# Patient Record
Sex: FEMALE | Race: WHITE | Employment: UNEMPLOYED | RURAL
[De-identification: names, ages, dates, MRNs, and addresses within clinical notes are randomized per-mention and may not be internally consistent; named-entity substitution may affect disease eponyms.]

---

## 2017-07-03 ENCOUNTER — OFFICE VISIT (OUTPATIENT)
Dept: INTERNAL MEDICINE CLINIC | Age: 32
End: 2017-07-03

## 2017-07-03 VITALS
DIASTOLIC BLOOD PRESSURE: 64 MMHG | RESPIRATION RATE: 17 BRPM | HEART RATE: 95 BPM | OXYGEN SATURATION: 97 % | SYSTOLIC BLOOD PRESSURE: 102 MMHG | HEIGHT: 56 IN | TEMPERATURE: 96.8 F

## 2017-07-03 DIAGNOSIS — D50.9 IRON DEFICIENCY ANEMIA, UNSPECIFIED IRON DEFICIENCY ANEMIA TYPE: ICD-10-CM

## 2017-07-03 DIAGNOSIS — N30.00 ACUTE CYSTITIS WITHOUT HEMATURIA: Primary | ICD-10-CM

## 2017-07-03 DIAGNOSIS — Z13.220 SCREENING FOR CHOLESTEROL LEVEL: ICD-10-CM

## 2017-07-03 DIAGNOSIS — M54.32 BILATERAL SCIATICA: ICD-10-CM

## 2017-07-03 DIAGNOSIS — Q68.8 ARTHROGRYPOSIS: ICD-10-CM

## 2017-07-03 DIAGNOSIS — K44.9 ESOPHAGEAL HIATAL HERNIA: ICD-10-CM

## 2017-07-03 DIAGNOSIS — M54.31 BILATERAL SCIATICA: ICD-10-CM

## 2017-07-03 DIAGNOSIS — Z76.89 ENCOUNTER TO ESTABLISH CARE: ICD-10-CM

## 2017-07-03 DIAGNOSIS — F41.9 ANXIETY: ICD-10-CM

## 2017-07-03 LAB
BILIRUB UR QL STRIP: NEGATIVE
GLUCOSE UR-MCNC: NEGATIVE MG/DL
KETONES P FAST UR STRIP-MCNC: NEGATIVE MG/DL
PH UR STRIP: 5.5 [PH] (ref 4.6–8)
PROT UR QL STRIP: NEGATIVE MG/DL
SP GR UR STRIP: 1.01 (ref 1–1.03)
UA UROBILINOGEN AMB POC: NORMAL (ref 0.2–1)
URINALYSIS CLARITY POC: CLEAR
URINALYSIS COLOR POC: NORMAL
URINE BLOOD POC: NORMAL
URINE LEUKOCYTES POC: NORMAL
URINE NITRITES POC: NEGATIVE

## 2017-07-03 RX ORDER — HYDROCODONE BITARTRATE AND ACETAMINOPHEN 10; 325 MG/1; MG/1
TABLET ORAL
COMMUNITY
End: 2017-09-06 | Stop reason: ALTCHOICE

## 2017-07-03 RX ORDER — TIZANIDINE 4 MG/1
TABLET ORAL
COMMUNITY
End: 2017-08-31 | Stop reason: SDUPTHER

## 2017-07-03 RX ORDER — TOLTERODINE 4 MG/1
4 CAPSULE, EXTENDED RELEASE ORAL DAILY
COMMUNITY
End: 2017-07-03 | Stop reason: SDUPTHER

## 2017-07-03 RX ORDER — METOPROLOL TARTRATE 50 MG/1
TABLET ORAL
COMMUNITY
End: 2017-07-03 | Stop reason: SDUPTHER

## 2017-07-03 RX ORDER — ZOLPIDEM TARTRATE 10 MG/1
TABLET ORAL
COMMUNITY
End: 2017-07-03 | Stop reason: SDUPTHER

## 2017-07-03 RX ORDER — BUSPIRONE HYDROCHLORIDE 15 MG/1
TABLET ORAL
COMMUNITY
End: 2017-10-18 | Stop reason: ALTCHOICE

## 2017-07-03 RX ORDER — ALBUTEROL SULFATE 90 UG/1
AEROSOL, METERED RESPIRATORY (INHALATION)
COMMUNITY
End: 2018-02-01 | Stop reason: ALTCHOICE

## 2017-07-03 RX ORDER — SERTRALINE HYDROCHLORIDE 100 MG/1
TABLET, FILM COATED ORAL
COMMUNITY
End: 2017-08-31 | Stop reason: SDUPTHER

## 2017-07-03 RX ORDER — DIAZEPAM 2 MG/1
TABLET ORAL
COMMUNITY
Start: 2017-07-03 | End: 2017-07-03 | Stop reason: SDUPTHER

## 2017-07-03 RX ORDER — ESOMEPRAZOLE MAGNESIUM 40 MG/1
CAPSULE, DELAYED RELEASE ORAL DAILY
COMMUNITY
End: 2017-08-01 | Stop reason: ALTCHOICE

## 2017-07-03 RX ORDER — NITROFURANTOIN 25; 75 MG/1; MG/1
100 CAPSULE ORAL 2 TIMES DAILY
Qty: 14 CAP | Refills: 0 | Status: SHIPPED | OUTPATIENT
Start: 2017-07-03 | End: 2017-07-10

## 2017-07-03 RX ORDER — DICLOFENAC SODIUM 10 MG/G
GEL TOPICAL 4 TIMES DAILY
COMMUNITY

## 2017-07-03 RX ORDER — TOLTERODINE 4 MG/1
4 CAPSULE, EXTENDED RELEASE ORAL DAILY
Qty: 90 CAP | Refills: 1 | Status: SHIPPED | OUTPATIENT
Start: 2017-07-03 | End: 2017-10-18 | Stop reason: ALTCHOICE

## 2017-07-03 RX ORDER — DIAZEPAM 2 MG/1
2 TABLET ORAL
Qty: 30 TAB | Refills: 3 | Status: SHIPPED | OUTPATIENT
Start: 2017-07-03 | End: 2017-09-23 | Stop reason: SDUPTHER

## 2017-07-03 RX ORDER — DIAZEPAM 2 MG/1
TABLET ORAL
COMMUNITY
End: 2017-07-03 | Stop reason: SDUPTHER

## 2017-07-03 RX ORDER — NITROGLYCERIN 0.6 MG/1
TABLET SUBLINGUAL
COMMUNITY
End: 2017-10-18 | Stop reason: ALTCHOICE

## 2017-07-03 RX ORDER — METOPROLOL TARTRATE 50 MG/1
TABLET ORAL
Qty: 180 TAB | Refills: 1 | Status: SHIPPED | OUTPATIENT
Start: 2017-07-03 | End: 2017-12-11 | Stop reason: SDUPTHER

## 2017-07-03 RX ORDER — ZOLPIDEM TARTRATE 10 MG/1
10 TABLET ORAL
Qty: 30 TAB | Refills: 5 | Status: SHIPPED | OUTPATIENT
Start: 2017-07-03 | End: 2018-01-08 | Stop reason: SDUPTHER

## 2017-07-03 NOTE — MR AVS SNAPSHOT
Visit Information Date & Time Provider Department Dept. Phone Encounter #  
 7/3/2017  3:00 PM Susie Gotti NP [facility] [phone] [id number] Follow-up Instructions Return in about 1 month (around 8/3/2017) for physical exam. Upcoming Health Maintenance Date Due DTaP/Tdap/Td series (1 - Tdap) 4/3/2008 PAP AKA CERVICAL CYTOLOGY 4/3/2008 INFLUENZA AGE 9 TO ADULT 8/1/2017 Allergies as of 7/3/2017  Review Complete On: 7/3/2017 By: Susie Gotti NP No Known Allergies Current Immunizations  Never Reviewed No immunizations on file. Not reviewed this visit You Were Diagnosed With   
  
 Codes Comments Encounter to establish care    -  Primary ICD-10-CM: Z76.89 
ICD-9-CM: V65.8 Bilateral sciatica     ICD-10-CM: M54.31, M54.32 
ICD-9-CM: 724.3 related Acute cystitis without hematuria     ICD-10-CM: N30.00 ICD-9-CM: 595.0 Esophageal hiatal hernia     ICD-10-CM: K44.9 ICD-9-CM: 553.3 Anxiety     ICD-10-CM: F41.9 ICD-9-CM: 300.00 Iron deficiency anemia, unspecified iron deficiency anemia type     ICD-10-CM: D50.9 ICD-9-CM: 280.9 Screening for cholesterol level     ICD-10-CM: Z13.220 ICD-9-CM: V77.91 Vitals BP Pulse Temp Resp Height(growth percentile) LMP  
 102/64 (BP 1 Location: Left arm, BP Patient Position: Sitting) 95 96.8 °F (36 °C) (Oral) 17 4' 8\" (1.422 m) 07/02/2017 (Exact Date) SpO2 OB Status Smoking Status 97% Having regular periods Never Smoker Preferred Pharmacy Pharmacy Name Phone [facility] [address] [facility], [facility] [address] [facility] [phone] Your Updated Medication List  
  
   
This list is accurate as of: 7/3/17  4:17 PM.  Always use your most recent med list.  
  
  
  
  
 albuterol 90 mcg/actuation inhaler Commonly known as:  PROVENTIL HFA, VENTOLIN HFA, PROAIR HFA Take  by inhalation. busPIRone 15 mg tablet Commonly known as:  BUSPAR Take 15 mg by mouth daily. diazePAM 2 mg tablet Commonly known as:  VALIUM Take 1 Tab by mouth every eight (8) hours as needed for Anxiety. Max Daily Amount: 6 mg. Take  by mouth every eight (8) hours as needed for muscle spasms. diclofenac 1 % Gel Commonly known as:  VOLTAREN Apply  to affected area four (4) times daily. HYDROcodone-acetaminophen  mg tablet Commonly known as:  Supriya Laity Take by mouth every 6 hours as needed. metoprolol tartrate 50 mg tablet Commonly known as:  LOPRESSOR Take 50 mg by mouth 2 times daily. NexIUM 40 mg capsule Generic drug:  esomeprazole Take  by mouth daily. nitrofurantoin (macrocrystal-monohydrate) 100 mg capsule Commonly known as:  MACROBID Take 1 Cap by mouth two (2) times a day for 7 days. nitroglycerin 0.6 mg SL tablet Commonly known as:  [place] [address] 0.6 mg under the tongue every 5 minutes as needed for chest pain. (if pain persists after 5 min, call 911) May take up to 3 doses  
  
 sertraline 100 mg tablet Commonly known as:  ZOLOFT Take 100 mg by mouth 2 times daily. tiZANidine 4 mg tablet Commonly known as:  Patbriana Beets Take 4 mg by mouth 2 times daily. tolterodine ER 4 mg ER capsule Commonly known as:  Lopez Bodily Take 1 Cap by mouth daily. zolpidem 10 mg tablet Commonly known as:  AMBIEN Take 1 Tab by mouth nightly as needed for Sleep. Max Daily Amount: 10 mg.  
  
  
  
  
Prescriptions Printed Refills  
 zolpidem (AMBIEN) 10 mg tablet 5 Sig: Take 1 Tab by mouth nightly as needed for Sleep. Max Daily Amount: 10 mg.  
 Class: Print Route: Oral  
 diazePAM (VALIUM) 2 mg tablet 3 Sig: Take 1 Tab by mouth every eight (8) hours as needed for Anxiety. Max Daily Amount: 6 mg. Take  by mouth every eight (8) hours as needed for muscle spasms. Class: Print Route: Oral  
  
Prescriptions Sent to Pharmacy Refills nitrofurantoin, macrocrystal-monohydrate, (MACROBID) 100 mg capsule 0 Sig: Take 1 Cap by mouth two (2) times a day for 7 days. Class: Normal  
 Pharmacy: [address], [address] [facility] Ph #: [phone] Route: Oral  
 tolterodine ER (DETROL LA) 4 mg ER capsule 1 Sig: Take 1 Cap by mouth daily. Class: Normal  
 Pharmacy: [address], [address] [facility] Ph #: [phone] Route: Oral  
 metoprolol tartrate (LOPRESSOR) 50 mg tablet 1 Sig: Take 50 mg by mouth 2 times daily. Class: Normal  
 Pharmacy: [address], [address] [facility] Ph #: [phone] We Performed the Following AMB POC URINALYSIS DIP STICK MANUAL W/O MICRO [43194 CPT(R)] REFERRAL TO GASTROENTEROLOGY [DHQ34 Custom] Comments:  
 Please evaluate and treatment for Hiatal Hernia ? Pain after eating REFERRAL TO ORTHOPEDICS [MPW887 Custom] Comments:  
 Please evaluate and treat patient for sciatica Follow-up Instructions Return in about 1 month (around 8/3/2017) for physical exam. To-Do List   
 07/03/2017 Lab:  CBC WITH AUTOMATED DIFF   
  
 07/03/2017 Lab:  LIPID PANEL   
  
 07/03/2017 Lab:  METABOLIC PANEL, COMPREHENSIVE   
  
 07/03/2017 Lab:  TSH 3RD GENERATION Referral Information Referral ID Referred By Referred To  
  
 [id number] Lucy Laboy1 N Ignacio Ryan MD   
   [address]  Phone: [phone] Fax: [phone] Visits Status Start Date End Date 1 New Request 7/3/17 7/3/18 If your referral has a status of pending review or denied, additional information will be sent to support the outcome of this decision. Referral ID Referred By Referred To  
 [id number] Christopherken Archibaldleonard CALDERON Not Available Visits Status Start Date End Date 1 New Request 7/3/17 7/3/18 If your referral has a status of pending review or denied, additional information will be sent to support the outcome of this decision. Patient Instructions Call office with your date and time for appointment Introducing \[facility]\"" & [facility] SERVICES! Ping Mendez introduces Roundscapes patient portal. Now you can access parts of your medical record, email your doctor's office, and request medication refills online. 1. In your internet browser, go to [email or web address] 2. Click on the First Time User? Click Here link in the Sign In box. You will see the New Member Sign Up page. 3. Enter your Roundscapes Access Code exactly as it appears below. You will not need to use this code after youve completed the sign-up process. If you do not sign up before the expiration date, you must request a new code. · Roundscapes Access Code: [email or web address] Expires: 10/1/2017  2:54 PM 
 
4. Enter the last four digits of your Social Security Number (xxxx) and Date of Birth (mm/dd/yyyy) as indicated and click Submit. You will be taken to the next sign-up page. 5. Create a Roundscapes ID. This will be your Roundscapes login ID and cannot be changed, so think of one that is secure and easy to remember. 6. Create a Roundscapes password. You can change your password at any time. 7. Enter your Password Reset Question and Answer. This can be used at a later time if you forget your password. 8. Enter your e-mail address. You will receive e-mail notification when new information is available in [address] 9. Click Sign Up. You can now view and download portions of your medical record. 10. Click the Download Summary menu link to download a portable copy of your medical information. If you have questions, please visit the Frequently Asked Questions section of the Roundscapes website. Remember, Roundscapes is NOT to be used for urgent needs. For medical emergencies, dial 911. Now available from your iPhone and Android! Please provide this summary of care documentation to your next provider. Your primary care clinician is listed as Sylvester Jean. If you have any questions after today's visit, please call [phone].

## 2017-07-07 NOTE — PROGRESS NOTES
HISTORY OF PRESENT ILLNESS  Daisy Lizama is a 29 y.o. female. HPI  Chief Complaint   Patient presents with    New Patient     ESTABLISH CARE     Past Medical History:   Diagnosis Date    Anxiety     2005    Arthrogryposis     Age 4 years   [employer] Bilateral club feet     Hernia, paraesophageal     Hiatal hernia     Neurogenic bladder     Neurogenic bowel     S/P hip replacement both hips     [facility]/ July 10 appointment.  Sacral agenesis     Sciatica     After hip surgery    Umbilical hernia      Social History     Social History    Marital status: UNKNOWN     Spouse name: N/A    Number of children: N/A    Years of education: N/A     Occupational History    Not on file. Social History Main Topics    Smoking status: Never Smoker    Smokeless tobacco: Never Used    Alcohol use No    Drug use: No    Sexual activity: Not Currently     Other Topics Concern    Not on file     Social History Narrative    No narrative on file     Review of Systems   Constitutional: Negative for chills, fever and malaise/fatigue. HENT: Negative. Eyes: Negative. Cardiovascular: Negative. Gastrointestinal: Negative. Genitourinary: Negative. Skin: Negative. Physical Exam   Constitutional: She is oriented to person, place, and time. She appears well-developed and well-nourished. HENT:   Head: Normocephalic and atraumatic. Cardiovascular: Normal rate, regular rhythm, normal heart sounds and intact distal pulses. Exam reveals no gallop and no friction rub. No murmur heard. Pulmonary/Chest: Breath sounds normal. No respiratory distress. She has no wheezes. She has no rales. Musculoskeletal: Normal range of motion. Neurological: She is alert and oriented to person, place, and time. Nursing note and vitals reviewed. Plan of care and AVS reviewed with patient who verbalized understanding. ASSESSMENT and PLANNurs    ICD-10-CM ICD-9-CM    1.  Acute cystitis without hematuria N30.00 595.0 nitrofurantoin, macrocrystal-monohydrate, (MACROBID) 100 mg capsule      METABOLIC PANEL, COMPREHENSIVE   2. Encounter to establish care Z76.89 V65.8 AMB POC URINALYSIS DIP STICK MANUAL W/O MICRO   3. Bilateral sciatica M54.31 724.3 REFERRAL TO ORTHOPEDICS    M54.32  REFERRAL TO PAIN MANAGEMENT    related   4. Esophageal hiatal hernia K44.9 553.3 REFERRAL TO GASTROENTEROLOGY      METABOLIC PANEL, COMPREHENSIVE   5. Anxiety F41.9 300.00 TSH 3RD GENERATION   6. Iron deficiency anemia, unspecified iron deficiency anemia type D50.9 280.9 CBC WITH AUTOMATED DIFF   7. Screening for cholesterol level Z13.220 V77.91 LIPID PANEL   8. Arthrogryposis Q68.8 728.3 REFERRAL TO PAIN MANAGEMENT   Nursing to assist  patient with appointment for pain management. To get labs before physical in 1 month.

## 2017-07-17 DIAGNOSIS — D50.9 IRON DEFICIENCY ANEMIA, UNSPECIFIED IRON DEFICIENCY ANEMIA TYPE: Primary | ICD-10-CM

## 2017-07-18 ENCOUNTER — TELEPHONE (OUTPATIENT)
Dept: INTERNAL MEDICINE CLINIC | Age: 32
End: 2017-07-18

## 2017-07-18 NOTE — TELEPHONE ENCOUNTER
Chief Complaint   Patient presents with    Labs     CBC     Left message to inform the patient per Everardo Perez DNP that the daughter is anemic and should take an OTC iron supplement 325 mg orally daily and to repeat CBC in one month. Left message in regards whether she would like the requisition to be mailed or picked up at the office.

## 2017-07-27 ENCOUNTER — CLINICAL SUPPORT (OUTPATIENT)
Dept: INTERNAL MEDICINE CLINIC | Age: 32
End: 2017-07-27

## 2017-07-27 DIAGNOSIS — F41.9 ANXIETY: ICD-10-CM

## 2017-07-27 DIAGNOSIS — Z13.220 SCREENING FOR CHOLESTEROL LEVEL: ICD-10-CM

## 2017-07-27 DIAGNOSIS — D50.9 IRON DEFICIENCY ANEMIA, UNSPECIFIED IRON DEFICIENCY ANEMIA TYPE: ICD-10-CM

## 2017-07-27 DIAGNOSIS — N30.00 ACUTE CYSTITIS WITHOUT HEMATURIA: ICD-10-CM

## 2017-07-27 DIAGNOSIS — K44.9 ESOPHAGEAL HIATAL HERNIA: ICD-10-CM

## 2017-07-27 NOTE — PROGRESS NOTES
Chief Complaint   Patient presents with    Labs Only     TSH, CMP, CBC, LIPID      The patient presents with lab draw only. Labs drawn from right antecubital fossa. No negative reaction noted to site of lab draw.

## 2017-07-28 DIAGNOSIS — D50.9 IRON DEFICIENCY ANEMIA, UNSPECIFIED IRON DEFICIENCY ANEMIA TYPE: Primary | ICD-10-CM

## 2017-07-28 LAB
ALBUMIN SERPL-MCNC: 4.3 G/DL (ref 3.5–5.5)
ALBUMIN/GLOB SERPL: 2 {RATIO} (ref 1.2–2.2)
ALP SERPL-CCNC: 62 IU/L (ref 39–117)
ALT SERPL-CCNC: 15 IU/L (ref 0–32)
AST SERPL-CCNC: 21 IU/L (ref 0–40)
BASOPHILS # BLD AUTO: 0 X10E3/UL (ref 0–0.2)
BASOPHILS NFR BLD AUTO: 1 %
BILIRUB SERPL-MCNC: 0.7 MG/DL (ref 0–1.2)
BUN SERPL-MCNC: 14 MG/DL (ref 6–20)
BUN/CREAT SERPL: 20 (ref 9–23)
CALCIUM SERPL-MCNC: 8.5 MG/DL (ref 8.7–10.2)
CHLORIDE SERPL-SCNC: 101 MMOL/L (ref 96–106)
CHOLEST SERPL-MCNC: 110 MG/DL (ref 100–199)
CO2 SERPL-SCNC: 20 MMOL/L (ref 18–29)
CREAT SERPL-MCNC: 0.7 MG/DL (ref 0.57–1)
EOSINOPHIL # BLD AUTO: 0.2 X10E3/UL (ref 0–0.4)
EOSINOPHIL NFR BLD AUTO: 3 %
ERYTHROCYTE [DISTWIDTH] IN BLOOD BY AUTOMATED COUNT: 18.5 % (ref 12.3–15.4)
GLOBULIN SER CALC-MCNC: 2.2 G/DL (ref 1.5–4.5)
GLUCOSE SERPL-MCNC: 88 MG/DL (ref 65–99)
HCT VFR BLD AUTO: 31.3 % (ref 34–46.6)
HDLC SERPL-MCNC: 36 MG/DL
HGB BLD-MCNC: 8.5 G/DL (ref 11.1–15.9)
IMM GRANULOCYTES # BLD: 0 X10E3/UL (ref 0–0.1)
IMM GRANULOCYTES NFR BLD: 1 %
INTERPRETATION, 910389: NORMAL
LDLC SERPL CALC-MCNC: 53 MG/DL (ref 0–99)
LYMPHOCYTES # BLD AUTO: 1.1 X10E3/UL (ref 0.7–3.1)
LYMPHOCYTES NFR BLD AUTO: 19 %
MCH RBC QN AUTO: 18.6 PG (ref 26.6–33)
MCHC RBC AUTO-ENTMCNC: 27.2 G/DL (ref 31.5–35.7)
MCV RBC AUTO: 68 FL (ref 79–97)
MONOCYTES # BLD AUTO: 0.4 X10E3/UL (ref 0.1–0.9)
MONOCYTES NFR BLD AUTO: 6 %
NEUTROPHILS # BLD AUTO: 4.3 X10E3/UL (ref 1.4–7)
NEUTROPHILS NFR BLD AUTO: 70 %
PLATELET # BLD AUTO: 150 X10E3/UL (ref 150–379)
POTASSIUM SERPL-SCNC: 4.4 MMOL/L (ref 3.5–5.2)
PROT SERPL-MCNC: 6.5 G/DL (ref 6–8.5)
RBC # BLD AUTO: 4.58 X10E6/UL (ref 3.77–5.28)
SODIUM SERPL-SCNC: 138 MMOL/L (ref 134–144)
TRIGL SERPL-MCNC: 105 MG/DL (ref 0–149)
TSH SERPL DL<=0.005 MIU/L-ACNC: 4.03 UIU/ML (ref 0.45–4.5)
VLDLC SERPL CALC-MCNC: 21 MG/DL (ref 5–40)
WBC # BLD AUTO: 6.1 X10E3/UL (ref 3.4–10.8)

## 2017-07-28 RX ORDER — FERROUS SULFATE 325(65) MG
325 TABLET, DELAYED RELEASE (ENTERIC COATED) ORAL 2 TIMES DAILY
COMMUNITY
Start: 2017-07-28 | End: 2018-02-07 | Stop reason: SDUPTHER

## 2017-08-01 ENCOUNTER — OFFICE VISIT (OUTPATIENT)
Dept: INTERNAL MEDICINE CLINIC | Age: 32
End: 2017-08-01

## 2017-08-01 VITALS
OXYGEN SATURATION: 98 % | DIASTOLIC BLOOD PRESSURE: 59 MMHG | RESPIRATION RATE: 17 BRPM | HEIGHT: 56 IN | HEART RATE: 80 BPM | SYSTOLIC BLOOD PRESSURE: 101 MMHG | TEMPERATURE: 96.8 F

## 2017-08-01 DIAGNOSIS — L85.3 DRY SKIN: ICD-10-CM

## 2017-08-01 DIAGNOSIS — K21.9 GASTROESOPHAGEAL REFLUX DISEASE WITHOUT ESOPHAGITIS: ICD-10-CM

## 2017-08-01 DIAGNOSIS — D50.9 IRON DEFICIENCY ANEMIA, UNSPECIFIED IRON DEFICIENCY ANEMIA TYPE: Primary | ICD-10-CM

## 2017-08-01 DIAGNOSIS — Z00.00 PHYSICAL EXAM: ICD-10-CM

## 2017-08-01 PROBLEM — M54.50 LOW BACK PAIN: Status: ACTIVE | Noted: 2017-07-31

## 2017-08-01 RX ORDER — PETROLATUM 42 G/100G
OINTMENT TOPICAL AS NEEDED
Refills: 0 | COMMUNITY
Start: 2017-08-01

## 2017-08-01 RX ORDER — METHYLPREDNISOLONE 4 MG/1
TABLET ORAL
COMMUNITY
Start: 2017-07-31 | End: 2017-08-01 | Stop reason: ALTCHOICE

## 2017-08-01 RX ORDER — PHENOL/SODIUM PHENOLATE
20 AEROSOL, SPRAY (ML) MUCOUS MEMBRANE DAILY
Qty: 90 TAB | Refills: 1 | Status: SHIPPED | OUTPATIENT
Start: 2017-08-01 | End: 2017-10-18 | Stop reason: ALTCHOICE

## 2017-08-01 RX ORDER — HYDROCODONE BITARTRATE AND ACETAMINOPHEN 5; 325 MG/1; MG/1
1 TABLET ORAL
COMMUNITY
Start: 2017-07-31 | End: 2017-08-06 | Stop reason: SDUPTHER

## 2017-08-01 RX ORDER — ESOMEPRAZOLE MAGNESIUM 40 MG/1
CAPSULE, DELAYED RELEASE ORAL DAILY
Status: CANCELLED | OUTPATIENT
Start: 2017-08-01

## 2017-08-01 RX ORDER — HYDROCODONE BITARTRATE AND ACETAMINOPHEN 10; 325 MG/1; MG/1
TABLET ORAL
COMMUNITY
End: 2017-09-06 | Stop reason: ALTCHOICE

## 2017-08-01 NOTE — MR AVS SNAPSHOT
Visit Information Date & Time Provider Department Dept. Phone Encounter #  
 8/1/2017  1:00 PM Juan Diego Bronson, 1 [facility] [id number] Follow-up Instructions Return in about 6 months (around 2/1/2018) for Medication. Upcoming Health Maintenance Date Due  
 PAP AKA CERVICAL CYTOLOGY 9/30/2017* DTaP/Tdap/Td series (2 - Td) 7/3/2027 *Topic was postponed. The date shown is not the original due date. Allergies as of 8/1/2017  Review Complete On: 8/1/2017 By: Juan Diego Bronson NP No Known Allergies Current Immunizations  Never Reviewed No immunizations on file. Not reviewed this visit You Were Diagnosed With   
  
 Codes Comments Physical exam    -  Primary ICD-10-CM: Z00.00 ICD-9-CM: V70.9 Gastroesophageal reflux disease without esophagitis     ICD-10-CM: K21.9 ICD-9-CM: 530.81 Dry skin     ICD-10-CM: L85.3 ICD-9-CM: 701.1 Iron deficiency anemia, unspecified iron deficiency anemia type     ICD-10-CM: D50.9 ICD-9-CM: 280.9 Vitals BP Pulse Temp Resp Height(growth percentile) LMP  
 101/59 (BP 1 Location: Left arm, BP Patient Position: Sitting) 80 96.8 °F (36 °C) (Oral) 17 4' 8\" (1.422 m) 07/02/2017 (Exact Date) SpO2 OB Status Smoking Status 98% Having regular periods Never Smoker Preferred Pharmacy Pharmacy Name Phone [facility] [address] [facility], [facility] [address] [facility] [phone] Your Updated Medication List  
  
   
This list is accurate as of: 8/1/17  1:41 PM.  Always use your most recent med list.  
  
  
  
  
 albuterol 90 mcg/actuation inhaler Commonly known as:  PROVENTIL HFA, VENTOLIN HFA, PROAIR HFA Take  by inhalation. busPIRone 15 mg tablet Commonly known as:  BUSPAR Take 15 mg by mouth daily. diazePAM 2 mg tablet Commonly known as:  VALIUM Take 1 Tab by mouth every eight (8) hours as needed for Anxiety.  Max Daily Amount: 6 mg. Take  by mouth every eight (8) hours as needed for muscle spasms. diclofenac 1 % Gel Commonly known as:  VOLTAREN Apply  to affected area four (4) times daily. ferrous sulfate 325 mg (65 mg iron) EC tablet Commonly known as:  IRON Take 1 Tab by mouth two (2) times a day. * HYDROcodone-acetaminophen  mg tablet Commonly known as:  Breanna Mcclain Take by mouth every 6 hours as needed. * HYDROcodone-acetaminophen  mg tablet Commonly known as:  Breanna Mcclain Take by mouth every 6 hours as needed. * HYDROcodone-acetaminophen 5-325 mg per tablet Commonly known as:  Breanna Mcclain Take 1 Tab by mouth.  
  
 metoprolol tartrate 50 mg tablet Commonly known as:  LOPRESSOR Take 50 mg by mouth 2 times daily. mineral oil-hydrophil petrolat ointment Commonly known as:  AQUAPHOR Apply  to affected area as needed for Dry Skin. nitroglycerin 0.6 mg SL tablet Commonly known as:  [address] 0.6 mg under the tongue every 5 minutes as needed for chest pain. (if pain persists after 5 min, call 911) May take up to 3 doses Omeprazole delayed release 20 mg tablet Commonly known as:  PRILOSEC D/R Take 1 Tab by mouth daily. sertraline 100 mg tablet Commonly known as:  ZOLOFT Take 100 mg by mouth 2 times daily. tiZANidine 4 mg tablet Commonly known as:  Mariola Padilla Take 4 mg by mouth 2 times daily. tolterodine ER 4 mg ER capsule Commonly known as:  Geovanna Lozoya Take 1 Cap by mouth daily. zolpidem 10 mg tablet Commonly known as:  AMBIEN Take 1 Tab by mouth nightly as needed for Sleep. Max Daily Amount: 10 mg.  
  
 * Notice: This list has 3 medication(s) that are the same as other medications prescribed for you. Read the directions carefully, and ask your doctor or other care provider to review them with you. Prescriptions Sent to Pharmacy Refills Omeprazole delayed release (PRILOSEC D/R) 20 mg tablet 1 Sig: Take 1 Tab by mouth daily. Class: Normal  
 Pharmacy: [address], [address] Ph #: [phone] Route: Oral  
  
Follow-up Instructions Return in about 6 months (around 2/1/2018) for Medication. Patient Instructions Iron-Rich Diet: Care Instructions Your Care Instructions Your body needs iron to make hemoglobin. Hemoglobin is a substance in red blood cells that carries oxygen from the lungs to cells all through your body. If you do not get enough iron, your body makes fewer and smaller red blood cells. As a result, your body's cells may not get enough oxygen. Adult men need 8 milligrams of iron a day; adult women need 18 milligrams of iron a day. After menopause, women need 8 milligrams of iron a day. A pregnant woman needs 27 milligrams of iron a day. Infants and young children have higher iron needs relative to their size than other age groups. People who have lost blood because of ulcers or heavy menstrual periods may become very low in iron and may develop anemia. Most people can get the iron their bodies need by eating enough of certain iron-rich foods. Your doctor may recommend that you take an iron supplement along with eating an iron-rich diet. Follow-up care is a key part of your treatment and safety. Be sure to make and go to all appointments, and call your doctor if you are having problems. Its also a good idea to know your test results and keep a list of the medicines you take. How can you care for yourself at home? · Make iron-rich foods a part of your daily diet. Iron-rich foods include: ¨ All meats, such as chicken, beef, lamb, pork, fish, and shellfish. Liver is especially high in iron. ¨ Leafy green vegetables. ¨ Raisins, peas, beans, lentils, barley, and eggs. ¨ Iron-fortified breakfast cereals. · Eat foods with vitamin C along with iron-rich foods. Vitamin C helps you absorb more iron from food. Drink a glass of orange juice or another citrus juice with your food. · Eat meat and vegetables or grains together. The iron in meat helps your body absorb the iron in other foods. Where can you learn more? Go to http://kelton-tay.info/. Enter [id number] in the search box to learn more about \"Iron-Rich Diet: Care Instructions. \" Current as of: July 26, 2016 Content Version: 11.3 © 9284-5879 [employer]. Care instructions adapted under license by [employer] (which disclaims liability or warranty for this information). If you have questions about a medical condition or this instruction, always ask your healthcare professional. [address] any warranty or liability for your use of this information. Introducing hospitals & HEALTH SERVICES! Cintia Serna introduces Seniorlink patient portal. Now you can access parts of your medical record, email your doctor's office, and request medication refills online. 1. In your internet browser, go to [email or web address] 2. Click on the First Time User? Click Here link in the Sign In box. You will see the New Member Sign Up page. 3. Enter your Seniorlink Access Code exactly as it appears below. You will not need to use this code after youve completed the sign-up process. If you do not sign up before the expiration date, you must request a new code. · Seniorlink Access Code: [email or web address] Expires: 10/1/2017  2:54 PM 
 
4. Enter the last four digits of your Social Security Number (xxxx) and Date of Birth (mm/dd/yyyy) as indicated and click Submit. You will be taken to the next sign-up page. 5. Create a Seniorlink ID. This will be your Seniorlink login ID and cannot be changed, so think of one that is secure and easy to remember. 6. Create a BragBet password. You can change your password at any time. 7. Enter your Password Reset Question and Answer. This can be used at a later time if you forget your password. 8. Enter your e-mail address. You will receive e-mail notification when new information is available in [address] 9. Click Sign Up. You can now view and download portions of your medical record. 10. Click the Download Summary menu link to download a portable copy of your medical information. If you have questions, please visit the Frequently Asked Questions section of the BragBet website. Remember, BragBet is NOT to be used for urgent needs. For medical emergencies, dial 911. Now available from your iPhone and Android! Please provide this summary of care documentation to your next provider. Your primary care clinician is listed as Stan Hinds. If you have any questions after today's visit, please call [phone].

## 2017-08-01 NOTE — PROGRESS NOTES
HISTORY OF PRESENT ILLNESS  Ita Ledezma is a 29 y.o. female. HPI  Chief Complaint   Patient presents with    Complete Physical     Past Medical History:   Diagnosis Date    Anemia     Anxiety     2005    Arthrogryposis     Age 4 years   Margoth Gunning Bilateral club feet     Hernia, paraesophageal     Hiatal hernia     Neurogenic bladder     Neurogenic bowel     S/P hip replacement both hips     [facility]/ July [facility] appointment.  Sacral agenesis     Sciatica     After hip surgery    Umbilical hernia      Review of Systems   Constitutional: Negative for chills and fever. HENT: Positive for congestion. Negative for ear pain and sore throat. Respiratory: Positive for cough. Cardiovascular: Positive for leg swelling. Negative for chest pain. Gastrointestinal: Positive for nausea. Negative for blood in stool, constipation, diarrhea and heartburn. Musculoskeletal:        Has muscular pain left shoulder, back, legs  Has seen Dr. Melanie López. States he offered injections if needed. Skin: Negative. Neurological: Positive for headaches. Psychiatric/Behavioral: Positive for depression. Physical Exam   Constitutional: She is oriented to person, place, and time. She appears well-developed and well-nourished. No distress. HENT:   Head: Normocephalic and atraumatic. Eyes: Conjunctivae are normal.   Neck: Normal range of motion. Neck supple. Cardiovascular: Normal rate, regular rhythm, normal heart sounds and intact distal pulses. Exam reveals no gallop and no friction rub. No murmur heard. Pulmonary/Chest: Effort normal and breath sounds normal. No respiratory distress. She has no wheezes. She has no rales. Abdominal: Soft. Bowel sounds are normal. She exhibits no distension. There is no tenderness. There is no rebound and no guarding. Musculoskeletal: She exhibits tenderness. She exhibits no edema or deformity.    Wheel chair dependent  Positive for club feet and old surgical scars Neurological: She is alert and oriented to person, place, and time. Skin: Skin is warm and dry. No rash noted. She is not diaphoretic. No erythema. No pallor. Very dry skin B legs and feet. Nursing note and vitals reviewed. Plan of care and AVS reviewed with patient who verbalized understanding. ASSESSMENT and PLAN    ICD-10-CM ICD-9-CM    1. Iron deficiency anemia, unspecified iron deficiency anemia type D50.9 280.9 CBC W/O DIFF   2. Physical exam Z00.00 V70.9    3. Gastroesophageal reflux disease without esophagitis K21.9 530.81    4.  Dry skin L85.3 701.1 mineral oil-hydrophil petrolat (AQUAPHOR) ointment   F/U 6 months

## 2017-08-01 NOTE — PATIENT INSTRUCTIONS
Iron-Rich Diet: Care Instructions  Your Care Instructions  Your body needs iron to make hemoglobin. Hemoglobin is a substance in red blood cells that carries oxygen from the lungs to cells all through your body. If you do not get enough iron, your body makes fewer and smaller red blood cells. As a result, your body's cells may not get enough oxygen. Adult men need 8 milligrams of iron a day; adult women need 18 milligrams of iron a day. After menopause, women need 8 milligrams of iron a day. A pregnant woman needs 27 milligrams of iron a day. Infants and young children have higher iron needs relative to their size than other age groups. People who have lost blood because of ulcers or heavy menstrual periods may become very low in iron and may develop anemia. Most people can get the iron their bodies need by eating enough of certain iron-rich foods. Your doctor may recommend that you take an iron supplement along with eating an iron-rich diet. Follow-up care is a key part of your treatment and safety. Be sure to make and go to all appointments, and call your doctor if you are having problems. Its also a good idea to know your test results and keep a list of the medicines you take. How can you care for yourself at home? · Make iron-rich foods a part of your daily diet. Iron-rich foods include:  ¨ All meats, such as chicken, beef, lamb, pork, fish, and shellfish. Liver is especially high in iron. ¨ Leafy green vegetables. ¨ Raisins, peas, beans, lentils, barley, and eggs. ¨ Iron-fortified breakfast cereals. · Eat foods with vitamin C along with iron-rich foods. Vitamin C helps you absorb more iron from food. Drink a glass of orange juice or another citrus juice with your food. · Eat meat and vegetables or grains together. The iron in meat helps your body absorb the iron in other foods. Where can you learn more? Go to http://kelton-tay.info/.   Enter 2638 [id number] in the search box to learn more about \"Iron-Rich Diet: Care Instructions. \"  Current as of: July 26, 2016  Content Version: 11.3  © 0623-9253 [employer], [employer]. Care instructions adapted under license by [employer] (which disclaims liability or warranty for this information). If you have questions about a medical condition or this instruction, always ask your healthcare professional. [address] any warranty or liability for your use of this information.

## 2017-08-01 NOTE — PROGRESS NOTES
Chief Complaint   Patient presents with    Complete Physical     1. Have you been to the ER, urgent care clinic since your last visit? Hospitalized since your last visit? No    2. Have you seen or consulted any other health care providers outside of the [address] [place] [address] since your last visit? Include any pap smears or colon screening.  No     Health Maintenance Due   Topic Date Due    INFLUENZA AGE 9 TO ADULT  08/01/2017

## 2017-08-25 ENCOUNTER — CLINICAL SUPPORT (OUTPATIENT)
Dept: INTERNAL MEDICINE CLINIC | Age: 32
End: 2017-08-25

## 2017-08-25 VITALS
HEART RATE: 83 BPM | HEIGHT: 56 IN | SYSTOLIC BLOOD PRESSURE: 104 MMHG | DIASTOLIC BLOOD PRESSURE: 58 MMHG | RESPIRATION RATE: 16 BRPM | OXYGEN SATURATION: 96 % | TEMPERATURE: 96.8 F

## 2017-08-25 DIAGNOSIS — D50.9 IRON DEFICIENCY ANEMIA, UNSPECIFIED IRON DEFICIENCY ANEMIA TYPE: ICD-10-CM

## 2017-08-25 NOTE — PROGRESS NOTES
Chief Complaint   Patient presents with    Labs Only     CBC WO DIFF     The patient presents with lab draw only. Blood drawn from right forearm. No negative reaction noted.

## 2017-08-26 LAB
ERYTHROCYTE [DISTWIDTH] IN BLOOD BY AUTOMATED COUNT: 21.8 % (ref 12.3–15.4)
HCT VFR BLD AUTO: 33.8 % (ref 34–46.6)
HGB BLD-MCNC: 9.5 G/DL (ref 11.1–15.9)
MCH RBC QN AUTO: 19.9 PG (ref 26.6–33)
MCHC RBC AUTO-ENTMCNC: 28.1 G/DL (ref 31.5–35.7)
MCV RBC AUTO: 71 FL (ref 79–97)
PLATELET # BLD AUTO: 178 X10E3/UL (ref 150–379)
RBC # BLD AUTO: 4.78 X10E6/UL (ref 3.77–5.28)
WBC # BLD AUTO: 8.8 X10E3/UL (ref 3.4–10.8)

## 2017-08-31 NOTE — TELEPHONE ENCOUNTER
Requested Prescriptions     Pending Prescriptions Disp Refills    sertraline (ZOLOFT) 100 mg tablet      tiZANidine (ZANAFLEX) 4 mg tablet       Future Appointments:  2/1/2018   1:30 PM    Gini Posey NP             Last Appointment With Me:  8/1/2017   Last Filled:    Changes Made to Medication on Last Visit:  Unknown

## 2017-08-31 NOTE — TELEPHONE ENCOUNTER
Requested Prescriptions     Pending Prescriptions Disp Refills    sertraline (ZOLOFT) 100 mg tablet      tiZANidine (ZANAFLEX) 4 mg tablet

## 2017-09-01 RX ORDER — TIZANIDINE 4 MG/1
TABLET ORAL
Qty: 180 TAB | Refills: 1 | Status: SHIPPED | OUTPATIENT
Start: 2017-09-01 | End: 2018-02-01 | Stop reason: ALTCHOICE

## 2017-09-01 RX ORDER — SERTRALINE HYDROCHLORIDE 100 MG/1
TABLET, FILM COATED ORAL
Qty: 180 TAB | Refills: 1 | Status: SHIPPED | OUTPATIENT
Start: 2017-09-01 | End: 2018-02-06 | Stop reason: SDUPTHER

## 2017-09-05 ENCOUNTER — HOSPITAL ENCOUNTER (EMERGENCY)
Age: 32
Discharge: HOME | End: 2017-09-05
Attending: EMERGENCY MEDICINE | Admitting: EMERGENCY MEDICINE
Payer: MEDICAID

## 2017-09-05 VITALS
RESPIRATION RATE: 16 BRPM | WEIGHT: 165 LBS | TEMPERATURE: 98.3 F | DIASTOLIC BLOOD PRESSURE: 78 MMHG | OXYGEN SATURATION: 97 % | BODY MASS INDEX: 38.18 KG/M2 | HEART RATE: 95 BPM | HEIGHT: 55 IN | SYSTOLIC BLOOD PRESSURE: 127 MMHG

## 2017-09-05 DIAGNOSIS — M25.512 CHRONIC LEFT SHOULDER PAIN: ICD-10-CM

## 2017-09-05 DIAGNOSIS — M25.552 BILATERAL HIP PAIN: Primary | ICD-10-CM

## 2017-09-05 DIAGNOSIS — G89.29 CHRONIC LEFT SHOULDER PAIN: ICD-10-CM

## 2017-09-05 DIAGNOSIS — M25.551 BILATERAL HIP PAIN: Primary | ICD-10-CM

## 2017-09-05 PROCEDURE — 74011250636 HC RX REV CODE- 250/636: Performed by: PHYSICIAN ASSISTANT

## 2017-09-05 PROCEDURE — 96372 THER/PROPH/DIAG INJ SC/IM: CPT

## 2017-09-05 PROCEDURE — 99283 EMERGENCY DEPT VISIT LOW MDM: CPT

## 2017-09-05 PROCEDURE — 74011636637 HC RX REV CODE- 636/637: Performed by: PHYSICIAN ASSISTANT

## 2017-09-05 RX ORDER — PREDNISONE 20 MG/1
60 TABLET ORAL
Status: COMPLETED | OUTPATIENT
Start: 2017-09-05 | End: 2017-09-05

## 2017-09-05 RX ORDER — HYDROMORPHONE HYDROCHLORIDE 2 MG/ML
1 INJECTION, SOLUTION INTRAMUSCULAR; INTRAVENOUS; SUBCUTANEOUS
Status: COMPLETED | OUTPATIENT
Start: 2017-09-05 | End: 2017-09-05

## 2017-09-05 RX ORDER — OXYCODONE AND ACETAMINOPHEN 5; 325 MG/1; MG/1
1 TABLET ORAL
Qty: 15 TAB | Refills: 0 | Status: SHIPPED | OUTPATIENT
Start: 2017-09-05 | End: 2018-02-01 | Stop reason: ALTCHOICE

## 2017-09-05 RX ORDER — NAPROXEN 375 MG/1
375 TABLET ORAL 2 TIMES DAILY WITH MEALS
Qty: 20 TAB | Refills: 0 | Status: SHIPPED | OUTPATIENT
Start: 2017-09-05 | End: 2018-02-01 | Stop reason: ALTCHOICE

## 2017-09-05 RX ADMIN — PREDNISONE 60 MG: 20 TABLET ORAL at 15:21

## 2017-09-05 RX ADMIN — HYDROMORPHONE HYDROCHLORIDE 1 MG: 1 INJECTION, SOLUTION INTRAMUSCULAR; INTRAVENOUS; SUBCUTANEOUS at 15:21

## 2017-09-05 NOTE — DISCHARGE INSTRUCTIONS
Joint Pain: Care Instructions  Your Care Instructions  Many people have small aches and pains from overuse or injury to muscles and joints. Joint injuries often happen during sports or recreation, work tasks, or projects around the home. An overuse injury can happen when you put too much stress on a joint or when you do an activity that stresses the joint over and over, such as using the computer or rowing a boat. You can take action at home to help your muscles and joints get better. You should feel better in 1 to 2 weeks, but it can take 3 months or more to heal completely. Follow-up care is a key part of your treatment and safety. Be sure to make and go to all appointments, and call your doctor if you are having problems. It's also a good idea to know your test results and keep a list of the medicines you take. How can you care for yourself at home? · Do not put weight on the injured joint for at least a day or two. · For the first day or two after an injury, do not take hot showers or baths, and do not use hot packs. The heat could make swelling worse. · Put ice or a cold pack on the sore joint for 10 to 20 minutes at a time. Try to do this every 1 to 2 hours for the next 3 days (when you are awake) or until the swelling goes down. Put a thin cloth between the ice and your skin. · Wrap the injury in an elastic bandage. Do not wrap it too tightly because this can cause more swelling. · Prop up the sore joint on a pillow when you ice it or anytime you sit or lie down during the next 3 days. Try to keep it above the level of your heart. This will help reduce swelling. · Take an over-the-counter pain medicine, such as acetaminophen (Tylenol), ibuprofen (Advil, Motrin), or naproxen (Aleve). Read and follow all instructions on the label. · After 1 or 2 days of rest, begin moving the joint gently.  While the joint is still healing, you can begin to exercise using activities that do not strain or hurt the painful joint. When should you call for help? Call your doctor now or seek immediate medical care if:  · You have signs of infection, such as:  ¨ Increased pain, swelling, warmth, and redness. ¨ Red streaks leading from the joint. ¨ A fever. Watch closely for changes in your health, and be sure to contact your doctor if:  · Your movement or symptoms are not getting better after 1 to 2 weeks of home treatment. Where can you learn more? Go to http://kelton-tay.info/. Enter P205 in the search box to learn more about \"Joint Pain: Care Instructions. \"  Current as of: March 21, 2017  Content Version: 11.3  © 8210-0054 [employer]. Care instructions adapted under license by [employer] (which disclaims liability or warranty for this information). If you have questions about a medical condition or this instruction, always ask your healthcare professional. [address] any warranty or liability for your use of this information.

## 2017-09-05 NOTE — ED PROVIDER NOTES
HPI Comments: Ken Multani is a [address] [place] [employer] [place] y.o. female with pertinent history of sciatica, chronic back pain, and bilateral hip replacement last year who presents in wheelchair with family member to ED c/o acute on chronic exacerbation of lower back pain and bilateral upper leg pain for the past few days that worsened after a short car trip. Pt additionally c/o constant moderate left shoulder pain for the past month. Pt notes she went to an urgent care clinic, and xray imagining showed acromioclavicular joint widening. Her pain is exacerbated by movement. She has been evaluated by pain management specialist previously. Pt denies any recent falls or injury. PCP:  Mikaela Guy NP    Social Hx:  tobacco use (-), EtOH use (-)    There are no other complaints, changes or physical findings at this time. The history is provided by the patient. No  was used. Past Medical History:   Diagnosis Date    Anemia     Anxiety     2005    Arthrogryposis     Age 4 years   [facility] Bilateral club feet     Hernia, paraesophageal     Hiatal hernia     Neurogenic bladder     Neurogenic bowel     S/P hip replacement both hips     [facility]/ July 10 appointment.  Sacral agenesis     Sciatica     After hip surgery    Umbilical hernia        Past Surgical History:   Procedure Laterality Date    HX BREAST REDUCTION      HX HIP REPLACEMENT Bilateral     HX LUMBAR FUSION           History reviewed. No pertinent family history. Social History     Social History    Marital status: SINGLE     Spouse name: N/A    Number of children: N/A    Years of education: N/A     Occupational History    Not on file.      Social History Main Topics    Smoking status: Never Smoker    Smokeless tobacco: Never Used    Alcohol use No    Drug use: No    Sexual activity: Not Currently     Other Topics Concern    Not on file     Social History Narrative         ALLERGIES: Review of patient's allergies indicates no known allergies. Review of Systems   Constitutional: Negative for fatigue and fever. HENT: Negative for ear pain and sore throat. Eyes: Negative for pain, redness and visual disturbance. Respiratory: Negative for cough and shortness of breath. Cardiovascular: Negative for chest pain and palpitations. Gastrointestinal: Negative for abdominal pain, nausea and vomiting. Genitourinary: Negative for dysuria, frequency and urgency. Musculoskeletal: Positive for arthralgias, back pain and myalgias. Negative for gait problem, neck pain and neck stiffness. Skin: Negative for rash and wound. Neurological: Negative for dizziness, weakness, light-headedness, numbness and headaches. Vitals:    09/05/17 1355   BP: 127/78   Pulse: 95   Resp: 16   Temp: 98.3 °F (36.8 °C)   SpO2: 97%   Weight: 74.8 kg (165 lb)   Height: 4' 7\" (1.397 m)            Physical Exam   Constitutional: She is oriented to person, place, and time. She appears well-developed and well-nourished. Non-toxic appearance. No distress. Congenitally short stature. HENT:   Head: Normocephalic and atraumatic. Right Ear: External ear normal.   Left Ear: External ear normal.   Nose: Nose normal.   Mouth/Throat: Uvula is midline. No trismus in the jaw. Eyes: Conjunctivae and EOM are normal. Pupils are equal, round, and reactive to light. No scleral icterus. Neck: Normal range of motion and full passive range of motion without pain. Cardiovascular: Normal rate and regular rhythm. Pulmonary/Chest: Effort normal. No accessory muscle usage. No tachypnea. No respiratory distress. She has no decreased breath sounds. She has no wheezes. Abdominal: Soft. There is no tenderness. Musculoskeletal:   Well healed surgical scars of both hips. No bruising, redness, or swelling. Diffuse anterolateral tenderness bilaterally.    LEFT SHOULDER:  Good symmetry  No bruising, redness or swelling  ROM limited secondary to pain  Diffuse tenderness   Neurological: She is alert and oriented to person, place, and time. She is not disoriented. No cranial nerve deficit. GCS eye subscore is 4. GCS verbal subscore is 5. GCS motor subscore is 6. Skin: Skin is intact. No rash noted. Psychiatric: She has a normal mood and affect. Her speech is normal.   Nursing note and vitals reviewed. MDM  Number of Diagnoses or Management Options  Bilateral hip pain:   Chronic left shoulder pain:   Diagnosis management comments: Afebrile, well appearing, no specific injury, presentation reflects an exacerbation of a chronic problem, plan as below        Amount and/or Complexity of Data Reviewed  Review and summarize past medical records: yes    Patient Progress  Patient progress: stable    ED Course       Procedures      MEDICATIONS GIVEN:  Medications   HYDROmorphone (PF) (DILAUDID) injection 1 mg (1 mg IntraMUSCular Given 9/5/17 1521)   predniSONE (DELTASONE) tablet 60 mg (60 mg Oral Given 9/5/17 1521)       IMPRESSION:  1. Bilateral hip pain    2. Chronic left shoulder pain        PLAN:  1. Discharge Medication List as of 9/5/2017  3:11 PM      START taking these medications    Details   naproxen (NAPROSYN) 375 mg tablet Take 1 Tab by mouth two (2) times daily (with meals). , Print, Disp-20 Tab, R-0      oxyCODONE-acetaminophen (PERCOCET) 5-325 mg per tablet Take 1 Tab by mouth every four (4) hours as needed for Pain. Max Daily Amount: 6 Tabs., Print, Disp-15 Tab, R-0         CONTINUE these medications which have NOT CHANGED    Details   sertraline (ZOLOFT) 100 mg tablet Take 100 mg by mouth 2 times daily. , Normal, Disp-180 Tab, R-1      tiZANidine (ZANAFLEX) 4 mg tablet Take 4 mg by mouth 2 times daily. , Normal, Disp-180 Tab, R-1      !! HYDROcodone-acetaminophen (NORCO)  mg tablet Take by mouth every 6 hours as needed., Historical Med      Omeprazole delayed release (PRILOSEC D/R) 20 mg tablet Take 1 Tab by mouth daily. , Normal, Disp-90 Tab, R-1 mineral oil-hydrophil petrolat (AQUAPHOR) ointment Apply  to affected area as needed for Dry Skin., OTC, R-0      ferrous sulfate (IRON) 325 mg (65 mg iron) EC tablet Take 1 Tab by mouth two (2) times a day., Historical Med      busPIRone (BUSPAR) 15 mg tablet Take 15 mg by mouth daily. , Historical Med      !! HYDROcodone-acetaminophen (NORCO)  mg tablet Take by mouth every 6 hours as needed., Historical Med      nitroglycerin (NITROQUICK) 0.6 mg SL tablet Place 0.6 mg under the tongue every 5 minutes as needed for chest pain. (if pain persists after 5 min, call 911) May take up to 3 doses, Historical Med      albuterol (PROVENTIL HFA, VENTOLIN HFA, PROAIR HFA) 90 mcg/actuation inhaler Take  by inhalation. , Historical Med      diclofenac (VOLTAREN) 1 % gel Apply  to affected area four (4) times daily. , Historical Med      tolterodine ER (DETROL LA) 4 mg ER capsule Take 1 Cap by mouth daily. , Normal, Disp-90 Cap, R-1      metoprolol tartrate (LOPRESSOR) 50 mg tablet Take 50 mg by mouth 2 times daily. , Normal, Disp-180 Tab, R-1      zolpidem (AMBIEN) 10 mg tablet Take 1 Tab by mouth nightly as needed for Sleep. Max Daily Amount: 10 mg., Print, Disp-30 Tab, R-5      diazePAM (VALIUM) 2 mg tablet Take 1 Tab by mouth every eight (8) hours as needed for Anxiety. Max Daily Amount: 6 mg. Take  by mouth every eight (8) hours as needed for muscle spasms. , Print, Disp-30 Tab, R-3       !! - Potential duplicate medications found. Please discuss with provider.         2.   Follow-up Information     Follow up With Details Comments Claude 66, NP Schedule an appointment as soon as possible for a visit PRIMARY CARE: call to schedule follow up [address]  [phone]      [facility] DEPT OF ORTHOPEDICS Schedule an appointment as soon as possible for a visit ORTHO: call to schedule follow up [address] [facility]  [phone]        Return to ED if worse       DISCHARGE NOTE  3:41 PM  The patient has been re-evaluated and is ready for discharge. Reviewed available results with patient. Counseled pt on diagnosis and care plan. Pt has expressed understanding, and all questions have been answered. Pt agrees with plan and agrees to follow up as recommended, or return to the ED if their symptoms worsen. Discharge instructions have been provided and explained to the pt, along with reasons to return to the ED. Attestations: This note is prepared by Andrae Alvarado. Neita Nissen, acting as Scribe for Maximo Jones. KWAME Dotson Counter: The scribe's documentation has been prepared under my direction and personally reviewed by me in its entirety. I confirm that the note above accurately reflects all work, treatment, procedures, and medical decision making performed by me.

## 2017-09-06 ENCOUNTER — OFFICE VISIT (OUTPATIENT)
Dept: INTERNAL MEDICINE CLINIC | Age: 32
End: 2017-09-06

## 2017-09-06 VITALS
OXYGEN SATURATION: 95 % | SYSTOLIC BLOOD PRESSURE: 104 MMHG | RESPIRATION RATE: 16 BRPM | DIASTOLIC BLOOD PRESSURE: 63 MMHG | BODY MASS INDEX: 38.18 KG/M2 | HEART RATE: 89 BPM | HEIGHT: 55 IN | TEMPERATURE: 97.6 F | WEIGHT: 165 LBS

## 2017-09-06 DIAGNOSIS — M25.512 CHRONIC LEFT SHOULDER PAIN: ICD-10-CM

## 2017-09-06 DIAGNOSIS — M54.31 BILATERAL SCIATICA: Primary | ICD-10-CM

## 2017-09-06 DIAGNOSIS — G89.29 CHRONIC LEFT SHOULDER PAIN: ICD-10-CM

## 2017-09-06 DIAGNOSIS — M54.32 BILATERAL SCIATICA: Primary | ICD-10-CM

## 2017-09-06 PROBLEM — Z99.3 WHEELCHAIR DEPENDENT: Status: ACTIVE | Noted: 2017-09-06

## 2017-09-06 RX ORDER — GABAPENTIN 100 MG/1
300 CAPSULE ORAL
Qty: 90 CAP | Refills: 2 | Status: SHIPPED | OUTPATIENT
Start: 2017-09-06 | End: 2017-10-18 | Stop reason: SDUPTHER

## 2017-09-06 NOTE — PATIENT INSTRUCTIONS
Joint Injections: Care Instructions  Your Care Instructions  Joint injections are shots into a joint, such as the knee. They may be used to put in medicines, such as pain relievers. Or they can be used to take out fluid. Sometimes the fluid is tested in a lab. This can help find the cause of a joint problem. A corticosteroid, or steroid, shot is used to reduce inflammation in tendons or joints. It is often used to treat problems such as arthritis, tendinitis, and bursitis. Steroids can be injected directly into a painful, inflamed joint. They can also help reduce inflammation of a bursa. A bursa is a sac of fluid. It cushions and lubricates areas where tendons, ligaments, skin, muscles, or bones rub against each other. A steroid shot can sometimes help with short-term pain relief when other treatments haven't worked. If steroid shots help, pain may improve for weeks or months. Follow-up care is a key part of your treatment and safety. Be sure to make and go to all appointments, and call your doctor if you are having problems. It's also a good idea to know your test results and keep a list of the medicines you take. How can you care for yourself at home? · Put ice or a cold pack on the area for 10 to 20 minutes at a time. Put a thin cloth between the ice and your skin. · Take anti-inflammatory medicines to reduce pain, swelling, or inflammation. These include ibuprofen (Advil, Motrin) and naproxen (Aleve). Read and follow all instructions on the label. · Avoid strenuous activities for several days, especially those that put stress on the area where you got the shot. · If you have dressings over the area, keep them clean and dry. You may remove them when your doctor tells you to. When should you call for help? Call your doctor now or seek immediate medical care if:  · You have signs of infection, such as:  ¨ Increased pain, swelling, warmth, or redness. ¨ Red streaks leading from the site.   ¨ Pus draining from the site. ¨ A fever. Watch closely for changes in your health, and be sure to contact your doctor if you have any problems. Where can you learn more? Go to http://kelton-tay.info/. Enter N616 in the search box to learn more about \"Joint Injections: Care Instructions. \"  Current as of: March 21, 2017  Content Version: 11.3  © 5177-7437 [employer]. Care instructions adapted under license by [employer] (which disclaims liability or warranty for this information). If you have questions about a medical condition or this instruction, always ask your healthcare professional. [address] any warranty or liability for your use of this information.

## 2017-09-06 NOTE — MR AVS SNAPSHOT
Visit Information Date & Time Provider Department Dept. Phone Encounter #  
 9/6/2017  2:45 PM MD Jus Bustos Dr [id number] Follow-up Instructions Return in about 6 weeks (around 10/18/2017) for routine follow up. Your Appointments 2/1/2018  1:30 PM  
ROUTINE CARE with JIM Robins (LIAT SCHEDULING) Appt Note: f/u on medication $0 cp [facility] 8/1/17  
 [address] [place] [address] Road. [address] [facility] [place] [address]  
[phone]  
  
   
 [address] [place] [address] [address] Upcoming Health Maintenance Date Due  
 PAP AKA CERVICAL CYTOLOGY 9/30/2017* DTaP/Tdap/Td series (2 - Td) 7/3/2027 *Topic was postponed. The date shown is not the original due date. Allergies as of 9/6/2017  Review Complete On: 9/6/2017 By: Saray Caballero MD  
 No Known Allergies Current Immunizations  Never Reviewed No immunizations on file. Not reviewed this visit You Were Diagnosed With   
  
 Codes Comments Bilateral sciatica    -  Primary ICD-10-CM: M54.31, M54.32 
ICD-9-CM: 372. 3 Chronic left shoulder pain     ICD-10-CM: M25.512, G89.29 ICD-9-CM: 719.41, 338.29 Vitals BP Pulse Temp Resp Height(growth percentile) Weight(growth percentile) 104/63 (BP 1 Location: Left arm, BP Patient Position: Sitting) 89 97.6 °F (36.4 °C) (Oral) 16 4' 7\" (1.397 m) 165 lb (74.8 kg) LMP SpO2 BMI OB Status Smoking Status 08/03/2017 95% 38.35 kg/m2 Having regular periods Never Smoker BMI and BSA Data Body Mass Index Body Surface Area  
 38.35 kg/m 2 1.7 m 2 Preferred Pharmacy Pharmacy Name Phone [facility] [address] [facility] [place], [facility] [address] [phone] Your Updated Medication List  
  
   
This list is accurate as of: 9/6/17  3:38 PM.  Always use your most recent med list.  
  
  
  
  
 albuterol 90 mcg/actuation inhaler Commonly known as:  PROVENTIL HFA, VENTOLIN HFA, PROAIR HFA Take  by inhalation. busPIRone 15 mg tablet Commonly known as:  BUSPAR Take 15 mg by mouth daily. diazePAM 2 mg tablet Commonly known as:  VALIUM Take 1 Tab by mouth every eight (8) hours as needed for Anxiety. Max Daily Amount: 6 mg. Take  by mouth every eight (8) hours as needed for muscle spasms. diclofenac 1 % Gel Commonly known as:  VOLTAREN Apply  to affected area four (4) times daily. ferrous sulfate 325 mg (65 mg iron) EC tablet Commonly known as:  IRON Take 1 Tab by mouth two (2) times a day.  
  
 gabapentin 100 mg capsule Commonly known as:  NEURONTIN Take 3 Caps by mouth nightly. Start 1 tablet daily, usually in the evening, every few days increase as tolerated, for pain. metoprolol tartrate 50 mg tablet Commonly known as:  LOPRESSOR Take 50 mg by mouth 2 times daily. mineral oil-hydrophil petrolat ointment Commonly known as:  AQUAPHOR Apply  to affected area as needed for Dry Skin. naproxen 375 mg tablet Commonly known as:  NAPROSYN Take 1 Tab by mouth two (2) times daily (with meals). nitroglycerin 0.6 mg SL tablet Commonly known as:  [address] 0.6 mg under the tongue every 5 minutes as needed for chest pain. (if pain persists after 5 min, call 911) May take up to 3 doses Omeprazole delayed release 20 mg tablet Commonly known as:  PRILOSEC D/R Take 1 Tab by mouth daily. oxyCODONE-acetaminophen 5-325 mg per tablet Commonly known as:  PERCOCET Take 1 Tab by mouth every four (4) hours as needed for Pain. Max Daily Amount: 6 Tabs. sertraline 100 mg tablet Commonly known as:  ZOLOFT Take 100 mg by mouth 2 times daily. tiZANidine 4 mg tablet Commonly known as:  Jaunita Shallow Take 4 mg by mouth 2 times daily. tolterodine ER 4 mg ER capsule Commonly known as:  Ankita Matthews  
 Take 1 Cap by mouth daily. zolpidem 10 mg tablet Commonly known as:  AMBIEN Take 1 Tab by mouth nightly as needed for Sleep. Max Daily Amount: 10 mg.  
  
  
  
  
Prescriptions Sent to Pharmacy Refills  
 gabapentin (NEURONTIN) 100 mg capsule 2 Sig: Take 3 Caps by mouth nightly. Start 1 tablet daily, usually in the evening, every few days increase as tolerated, for pain. Class: Normal  
 Pharmacy: [facility] [address] [facility], [address] Ph #: [phone] Route: Oral  
  
We Performed the Following DRAIN/INJECT LARGE JOINT/BURSA [id number] CPT(R)] Follow-up Instructions Return in about 6 weeks (around 10/18/2017) for routine follow up. Patient Instructions Joint Injections: Care Instructions Your Care Instructions Joint injections are shots into a joint, such as the knee. They may be used to put in medicines, such as pain relievers. Or they can be used to take out fluid. Sometimes the fluid is tested in a lab. This can help find the cause of a joint problem. A corticosteroid, or steroid, shot is used to reduce inflammation in tendons or joints. It is often used to treat problems such as arthritis, tendinitis, and bursitis. Steroids can be injected directly into a painful, inflamed joint. They can also help reduce inflammation of a bursa. A bursa is a sac of fluid. It cushions and lubricates areas where tendons, ligaments, skin, muscles, or bones rub against each other. A steroid shot can sometimes help with short-term pain relief when other treatments haven't worked. If steroid shots help, pain may improve for weeks or months. Follow-up care is a key part of your treatment and safety. Be sure to make and go to all appointments, and call your doctor if you are having problems. It's also a good idea to know your test results and keep a list of the medicines you take. How can you care for yourself at home? · Put ice or a cold pack on the area for 10 to 20 minutes at a time. Put a thin cloth between the ice and your skin. · Take anti-inflammatory medicines to reduce pain, swelling, or inflammation. These include ibuprofen (Advil, Motrin) and naproxen (Aleve). Read and follow all instructions on the label. · Avoid strenuous activities for several days, especially those that put stress on the area where you got the shot. · If you have dressings over the area, keep them clean and dry. You may remove them when your doctor tells you to. When should you call for help? Call your doctor now or seek immediate medical care if: 
· You have signs of infection, such as: 
¨ Increased pain, swelling, warmth, or redness. ¨ Red streaks leading from the site. ¨ Pus draining from the site. ¨ A fever. Watch closely for changes in your health, and be sure to contact your doctor if you have any problems. Where can you learn more? Go to http://kelton-tay.info/. Enter N616 in the search box to learn more about \"Joint Injections: Care Instructions. \" Current as of: March 21, 2017 Content Version: 11.3 © 4781-3344 [employer]. Care instructions adapted under license by [employer] (which disclaims liability or warranty for this information). If you have questions about a medical condition or this instruction, always ask your healthcare professional. [place] [address] any warranty or liability for your use of this information. Introducing [facility]! Karley Miller introduces [employer] patient portal. Now you can access parts of your medical record, email your doctor's office, and request medication refills online. 1. In your internet browser, go to [email or web address] 2. Click on the First Time User? Click Here link in the Sign In box. You will see the New Member Sign Up page. 3. Enter your Rivalroo Access Code exactly as it appears below. You will not need to use this code after youve completed the sign-up process. If you do not sign up before the expiration date, you must request a new code. · Rivalroo Access Code: [email or web address] Expires: 10/1/2017  2:54 PM 
 
4. Enter the last four digits of your Social Security Number (xxxx) and Date of Birth (mm/dd/yyyy) as indicated and click Submit. You will be taken to the next sign-up page. 5. Create a Rivalroo ID. This will be your Rivalroo login ID and cannot be changed, so think of one that is secure and easy to remember. 6. Create a Rivalroo password. You can change your password at any time. 7. Enter your Password Reset Question and Answer. This can be used at a later time if you forget your password. 8. Enter your e-mail address. You will receive e-mail notification when new information is available in [address]. 9. Click Sign Up. You can now view and download portions of your medical record. 10. Click the Download Summary menu link to download a portable copy of your medical information. If you have questions, please visit the Frequently Asked Questions section of the Rivalroo website. Remember, Rivalroo is NOT to be used for urgent needs. For medical emergencies, dial 911. Now available from your iPhone and Android! Please provide this summary of care documentation to your next provider. Your primary care clinician is listed as Morgan Martin. If you have any questions after today's visit, please call [phone].

## 2017-09-06 NOTE — PROGRESS NOTES
HISTORY OF PRESENT ILLNESS  Adrianna Sandoval is a 29 y.o. female. Back Pain    The history is provided by the patient and parent. This is a chronic problem. Episode onset: 4 days ago worse. The problem has been rapidly worsening (so went to ER). The problem occurs constantly. The pain is associated with no known injury (lng car ride). The quality of the pain is described as sharp, stabbing and shooting. The pain radiates to the left thigh and right thigh. The pain is moderate. Associated symptoms include numbness, tingling and weakness. Pertinent negatives include no fever, no weight loss and no perianal numbness. Associated symptoms comments: Bowel and bladder incontinence from birth. Treatments tried: given percocet Rx and shot of dilaudid. The patient's surgical history includes spinal fusion. Shoulder Pain    There was no injury mechanism. The left shoulder is affected. The pain is severe. The pain radiates. There is a history of shoulder surgery. Associated symptoms include numbness and tingling. Couple oral courses of steroids have been tried but they have not helped. She was given some Percocet but not really taken that. Mother relates various muscle relaxers as well as other pain medicines have been tried not really helped her pain that much. She has been to orthopedics they will really deal with her pain issues much suspect surgery is not an option. Past Medical History:   Diagnosis Date    Anemia     Anxiety     2005    Arthrogryposis     Age 4 years   Kv[place] Juan Bilateral club feet     Hernia, paraesophageal     Hiatal hernia     Neurogenic bladder     Neurogenic bowel     S/P hip replacement both hips     [facility]/ July 10 appointment.    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 Social History Main Topics    Smoking status: Never Smoker    Smokeless tobacco: Never Used    Alcohol use No    Drug use: No    Sexual activity: Not Currently     Other Topics Concern    Not on file     Social History Narrative           Review of Systems   Constitutional: Negative for fever and weight loss. Musculoskeletal: Positive for back pain. Neurological: Positive for tingling, weakness and numbness. Physical Exam  Visit Vitals    /63 (BP 1 Location: Left arm, BP Patient Position: Sitting)    Pulse 89    Temp 97.6 °F (36.4 °C) (Oral)    Resp 16    Ht 4' 7\" (1.397 m)    Wt 165 lb (74.8 kg)    LMP 08/03/2017    SpO2 95%    BMI 38.35 kg/m2     WD WN female NAD, wheelchair-bound short stature overweight  Positive impingement signs left shoulder no obvious redness swelling shoulder back areas. Heart RRR without murmers clicks or rubs  Lungs CTA  Abdo soft nontender  Ext no edema    ASSESSMENT and PLAN  Encounter Diagnoses   Name Primary?  Bilateral sciatica Yes    Chronic left shoulder pain      Orders Placed This Encounter    DRAIN/INJECT LARGE JOINT/BURSA    gabapentin (NEURONTIN) 100 mg capsule     Discussed possible side affects, precautions, and drug interactions and possible benefits of the medication(s). Options discussed. Discussed possible side affects and benefits of the procedure and/or medications. The patient agrees to undergo the procedure. Consent obtained. Sterile procedure followed. There were no complications and the procedure was well tolerated. Instructed patient to call me if it is ineffective or if there are any complications like increasing pain, redness or swelling.     The left shoulder was assessed and landmarks palpated and marked. The shoulder was prepped with alcohol. Syringe was prepared with 2 cc of kenalog and 6cc of lidocaine. The shoulder was injected using a posterior approach. Patient was notified of signs to look for in terms of post injection complications, like increasing redness or swelling or pain. Discussed the nature of back pain. Discussed how this is in general a 'red flag' diagnosis and the general limited and temporary nature of this problem as well as its re-occurrence. Discussed further work-up and treatment options. Discused narcotics and back pain: yes    Follow-up Disposition:  Return in about 6 weeks (around 10/18/2017) for routine follow up.

## 2017-09-06 NOTE — PROGRESS NOTES
Chief Complaint   Patient presents with    Back Pain     back pain     I have reviewed the patient's medical history in detail and updated the computerized patient record. Health Maintenance reviewed. 1. Have you been to the ER, urgent care clinic since your last visit? Hospitalized since your last visit?no    2. Have you seen or consulted any other health care providers outside of the [address] [place] [address] since your last visit? Include any pap smears or colon screening.  No    Encouraged pt to discuss pt's wishes with spouse/partner/family and bring them in the next appt to follow thru with the Advanced Directive    [facility] [place] NOTE        Chart reviewed for the following:   Kerry Crow LPN, have reviewed the History, Physical and updated the Allergic reactions for [address] performed immediately prior to start of procedure:   Kerry Crow LPN, have performed the following reviews on Yvonne Shore prior to the start of the procedure:            * Patient was identified by name and date of birth   * Agreement on procedure being performed was verified  * Risks and Benefits explained to the patient by Dr. Joellen Marie  * Procedure site verified and marked as necessary  * Patient was positioned for comfort  * Consent was signed and verified     Time: 3:20PM    Date of procedure: 9/6/2017    Procedure performed by:  Ulisses Ely MD    Provider assisted by: Eliazar Gaona LPN    Patient assisted by: Eliazar Gaona LPN    How tolerated by patient: Well    Post Procedural Pain Scale: 7/10    Comments: None

## 2017-09-27 RX ORDER — DIAZEPAM 2 MG/1
TABLET ORAL
Qty: 30 TAB | Refills: 3 | Status: SHIPPED | OUTPATIENT
Start: 2017-09-27 | End: 2017-11-18 | Stop reason: SDUPTHER

## 2017-10-10 ENCOUNTER — TELEPHONE (OUTPATIENT)
Dept: INTERNAL MEDICINE CLINIC | Age: 32
End: 2017-10-10

## 2017-10-10 NOTE — TELEPHONE ENCOUNTER
----- Message from Renetta Clarafadumo sent at 10/10/2017  2:24 PM EDT -----  Regarding: NPDario, from [address] [place] [address], inquired about the status of Certificate of Medical Necessity for incontinence supplies faxed on 10/06/17. Best contact [phone], fax [phone].

## 2017-10-10 NOTE — TELEPHONE ENCOUNTER
Chief Complaint   Patient presents with   [address] [facility]     Certificate of medical necessity faxed to [phone] and was received.

## 2017-10-18 ENCOUNTER — OFFICE VISIT (OUTPATIENT)
Dept: INTERNAL MEDICINE CLINIC | Age: 32
End: 2017-10-18

## 2017-10-18 VITALS
RESPIRATION RATE: 16 BRPM | SYSTOLIC BLOOD PRESSURE: 103 MMHG | DIASTOLIC BLOOD PRESSURE: 68 MMHG | TEMPERATURE: 97.1 F | HEART RATE: 74 BPM | OXYGEN SATURATION: 99 % | HEIGHT: 55 IN

## 2017-10-18 DIAGNOSIS — J01.20 ACUTE ETHMOIDAL SINUSITIS, RECURRENCE NOT SPECIFIED: ICD-10-CM

## 2017-10-18 DIAGNOSIS — M25.552 PAIN OF BOTH HIP JOINTS: Primary | ICD-10-CM

## 2017-10-18 DIAGNOSIS — N31.9 NEUROGENIC BLADDER: ICD-10-CM

## 2017-10-18 DIAGNOSIS — K21.9 GASTROESOPHAGEAL REFLUX DISEASE WITHOUT ESOPHAGITIS: ICD-10-CM

## 2017-10-18 DIAGNOSIS — M25.551 PAIN OF BOTH HIP JOINTS: Primary | ICD-10-CM

## 2017-10-18 RX ORDER — SOLIFENACIN SUCCINATE 5 MG/1
5 TABLET, FILM COATED ORAL DAILY
Qty: 90 TAB | Refills: 1 | Status: SHIPPED | OUTPATIENT
Start: 2017-10-18 | End: 2018-06-24 | Stop reason: SDUPTHER

## 2017-10-18 RX ORDER — GABAPENTIN 100 MG/1
CAPSULE ORAL
Qty: 120 CAP | Refills: 5 | Status: SHIPPED | OUTPATIENT
Start: 2017-10-18 | End: 2018-02-01 | Stop reason: ALTCHOICE

## 2017-10-18 RX ORDER — AZITHROMYCIN 250 MG/1
TABLET, FILM COATED ORAL
Qty: 6 TAB | Refills: 0 | Status: SHIPPED | OUTPATIENT
Start: 2017-10-18 | End: 2017-10-23

## 2017-10-18 RX ORDER — CHOLECALCIFEROL (VITAMIN D3) 50 MCG
1 CAPSULE ORAL DAILY
Qty: 30 CAP | Refills: 5 | Status: SHIPPED | OUTPATIENT
Start: 2017-10-18 | End: 2018-04-30 | Stop reason: SDUPTHER

## 2017-10-18 NOTE — PROGRESS NOTES
Chief Complaint   Patient presents with    Injection     SHOULDER INJECTION GIVEN BY DR. Wolfe [facility] FOLLOW-UP     1. Have you been to the ER, urgent care clinic since your last visit? Hospitalized since your last visit? No    2. Have you seen or consulted any other health care providers outside of the [address] [place] [address] since your last visit? Include any pap smears or colon screening. No     Health Maintenance Due   Topic Date Due    PAP AKA CERVICAL CYTOLOGY  04/03/2008     Patient had tried Nexium and Protonix. Patient had esophegeal dilation last week by Dr. Luis Alberto Anguiano. Nitroglycerin prescribed for esophageal issues.

## 2017-10-18 NOTE — MR AVS SNAPSHOT
Visit Information Date & Time Provider Department Dept. Phone Encounter #  
 10/18/2017 10:15 AM Ina San NP [facility] [id number] Follow-up Instructions Return if symptoms worsen or fail to improve. Your Appointments 2/1/2018  1:30 PM  
ROUTINE CARE with Ina San NP Kary Casanova (LIAT SCHEDULING) Appt Note: f/u on medication $0 cp [facility] 8/1/17  
 [address] [place] [address]. [address] [place] [id number] [phone]  
  
   
 [address] [place] [address] [address] Upcoming Health Maintenance Date Due  
 PAP AKA CERVICAL CYTOLOGY 4/3/2008 DTaP/Tdap/Td series (2 - Td) 7/3/2027 Allergies as of 10/18/2017  Review Complete On: 10/18/2017 By: Ina San NP No Known Allergies Current Immunizations  Never Reviewed No immunizations on file. Not reviewed this visit You Were Diagnosed With   
  
 Codes Comments Pain of both hip joints    -  Primary ICD-10-CM: M25.551, M25.552 ICD-9-CM: 719.45 Neurogenic bladder     ICD-10-CM: N31.9 ICD-9-CM: 596.54 Gastroesophageal reflux disease without esophagitis     ICD-10-CM: K21.9 ICD-9-CM: 530.81 Acute ethmoidal sinusitis, recurrence not specified     ICD-10-CM: J01.20 ICD-9-CM: 461.2 Vitals BP Pulse Temp Resp Height(growth percentile) SpO2  
 103/68 (BP 1 Location: Right arm, BP Patient Position: Sitting) 74 97.1 °F (36.2 °C) (Temporal) 16 4' 7\" (1.397 m) 99% OB Status Smoking Status Having regular periods Never Smoker Vitals History Preferred Pharmacy Pharmacy Name Phone [facility] [address] [facility] [address] [facility] [phone] Your Updated Medication List  
  
   
This list is accurate as of: 10/18/17 11:27 AM.  Always use your most recent med list.  
  
  
  
  
 albuterol 90 mcg/actuation inhaler Commonly known as:  PROVENTIL HFA, VENTOLIN HFA, PROAIR HFA Take  by inhalation. azithromycin 250 mg tablet Commonly known as:  Karenfinane Armagh Take 2 tablets today, then take 1 tablet daily  
  
 diazePAM 2 mg tablet Commonly known as:  VALIUM  
TAKE ONE TABLET BY MOUTH EVERY 8 HOURS AS NEEDED FOR ANXIETY AND MUSCLE SPASMS. MAX  DAILY AMOUNT OF 6 MG.  
  
 diclofenac 1 % Gel Commonly known as:  VOLTAREN Apply  to affected area four (4) times daily. ferrous sulfate 325 mg (65 mg iron) EC tablet Commonly known as:  IRON Take 1 Tab by mouth two (2) times a day.  
  
 gabapentin 100 mg capsule Commonly known as:  NEURONTIN Take 1 tab in the Am and 1 tab in the PM.  Titrate to 2 tabs in the Am and 2 tabs in the PM.  
  
 metoprolol tartrate 50 mg tablet Commonly known as:  LOPRESSOR Take 50 mg by mouth 2 times daily. mineral oil-hydrophil petrolat ointment Commonly known as:  AQUAPHOR Apply  to affected area as needed for Dry Skin. naproxen 375 mg tablet Commonly known as:  NAPROSYN Take 1 Tab by mouth two (2) times daily (with meals). Omeprazole Magnesium 20 mg Cpdr  
Take 1 Cap by mouth daily. oxyCODONE-acetaminophen 5-325 mg per tablet Commonly known as:  PERCOCET Take 1 Tab by mouth every four (4) hours as needed for Pain. Max Daily Amount: 6 Tabs. sertraline 100 mg tablet Commonly known as:  ZOLOFT Take 100 mg by mouth 2 times daily. solifenacin 5 mg tablet Commonly known as:  Evie Najera Take 1 Tab by mouth daily. tiZANidine 4 mg tablet Commonly known as:  [place] Take 4 mg by mouth 2 times daily. zolpidem 10 mg tablet Commonly known as:  AMBIEN Take 1 Tab by mouth nightly as needed for Sleep. Max Daily Amount: 10 mg.  
  
  
  
  
Prescriptions Sent to Pharmacy Refills  
 gabapentin (NEURONTIN) 100 mg capsule 5  Sig: Take 1 tab in the Am and 1 tab in the PM.  Titrate to 2 tabs in the Am and 2 tabs in the PM.  
 Class: Normal  
 Pharmacy: [facility] Ph #: [phone]  
 solifenacin (VESICARE) 5 mg tablet 1 Sig: Take 1 Tab by mouth daily. Class: Normal  
 Pharmacy: [facility] [address] [facility], [address] Ph #: [phone] Route: Oral  
 Omeprazole Magnesium 20 mg cpDR 5 Sig: Take 1 Cap by mouth daily. Class: Normal  
 Pharmacy: [facility] [address] [facility], [address] Ph #: [phone] Route: Oral  
 azithromycin (ZITHROMAX) 250 mg tablet 0 Sig: Take 2 tablets today, then take 1 tablet daily Class: Normal  
 Pharm[facility] [address] Ph #: [phone] Follow-up Instructions Return if symptoms worsen or fail to improve. Introducing \[facility] [place] [facility]\"" & [facility] SERVICES! Dear Tracey Garcia: Thank you for requesting a Accupal account. Our records indicate that you already have an active Accupal account. You can access your account anytime at [email or web address] Did you know that you can access your hospital and ER discharge instructions at any time in Accupal? You can also review all of your test results from your hospital stay or ER visit. Additional Information If you have questions, please visit the Frequently Asked Questions section of the Accupal website at [email or web address] Remember, Accupal is NOT to be used for urgent needs. For medical emergencies, dial 911. Now available from your iPhone and Android! Please provide this summary of care documentation to your next provider. Your primary care clinician is listed as Nubia Ruth. If you have any questions after today's visit, please call [phone].

## 2017-10-18 NOTE — PROGRESS NOTES
HISTORY OF PRESENT ILLNESS  James Paulino is a 29 y.o. female. HPI  Chief Complaint   Patient presents with    Injection     SHOULDER INJECTION GIVEN BY DR. iVdes Early FOLLOW-UP       States shoulder injection did not work,  Saw Dr. Heather Ojeda for trigger points in lower back yesterday. States pain today 4/10. Parent with patent and states will find someone to do shoulder injections from insurance. Esophageal dilitation done on Thursday by Dr. Odette Coello at [facility] in [place]. Request refill gabapentin and other meds  Stated was not clear on the dosage for gabapentin. C/O HA and nasal sinus congestion > 2 weeks. Denies fever or chills. States insurance will not pay of nitroglycerin for GERD and will need to check with insurance for payment. Review of Systems   Constitutional: Negative for chills, fever and malaise/fatigue. HENT: Positive for congestion, sinus pain and sore throat. Negative for ear discharge and ear pain. Eyes: Negative. Respiratory: Negative. Cardiovascular: Negative. Negative for chest pain and leg swelling. Gastrointestinal: Negative. Genitourinary: Negative. Musculoskeletal: Negative. Skin: Negative. Physical Exam   Constitutional: She is oriented to person, place, and time. She appears well-developed and well-nourished. No distress. HENT:   Head: Normocephalic and atraumatic. Positive for pain with palpation of frontal and maxillary sinus  Positive for post nasal drip   Eyes: Conjunctivae are normal.   Neck: Normal range of motion. Neck supple. Cardiovascular: Normal rate, regular rhythm, normal heart sounds and intact distal pulses. Exam reveals no gallop and no friction rub. No murmur heard. Pulmonary/Chest: Effort normal and breath sounds normal. No respiratory distress. She has no wheezes. She has no rales. Musculoskeletal: Normal range of motion. Lymphadenopathy:     She has no cervical adenopathy.    Neurological: She is alert and oriented to person, place, and time. Skin: Skin is warm and dry. She is not diaphoretic. Nursing note and vitals reviewed. Plan of care and AVS reviewed with patient who verbalized understanding. ASSESSMENT and PLAN    ICD-10-CM ICD-9-CM    1. Pain of both hip joints M25.551 719.45 gabapentin (NEURONTIN) 100 mg capsule    M25.552     2. Neurogenic bladder N31.9 596.54 solifenacin (VESICARE) 5 mg tablet   3. Gastroesophageal reflux disease without esophagitis K21.9 530.81 Omeprazole Magnesium 20 mg cpDR   4. Acute ethmoidal sinusitis, recurrence not specified J01.20 461.2 azithromycin (ZITHROMAX) 250 mg tablet   Reviewed with patient use of z vicki  Restarted gabapentin for pain in hips  Started vesicare  F/U in February - routine visit.

## 2017-10-23 PROBLEM — M25.511 CHRONIC PAIN OF BOTH SHOULDERS: Status: ACTIVE | Noted: 2017-08-22

## 2017-10-23 PROBLEM — G89.29 CHRONIC RADICULAR LUMBAR PAIN: Status: ACTIVE | Noted: 2017-08-22

## 2017-10-23 PROBLEM — M25.512 CHRONIC PAIN OF BOTH SHOULDERS: Status: ACTIVE | Noted: 2017-08-22

## 2017-10-23 PROBLEM — G89.29 CHRONIC PAIN OF BOTH SHOULDERS: Status: ACTIVE | Noted: 2017-08-22

## 2017-10-23 PROBLEM — R13.10 DYSPHAGIA: Status: ACTIVE | Noted: 2017-09-07

## 2017-10-23 PROBLEM — M54.16 CHRONIC RADICULAR LUMBAR PAIN: Status: ACTIVE | Noted: 2017-08-22

## 2017-10-23 PROBLEM — F11.90 CHRONIC, CONTINUOUS USE OF OPIOIDS: Status: ACTIVE | Noted: 2017-08-22

## 2017-10-27 ENCOUNTER — PATIENT MESSAGE (OUTPATIENT)
Dept: INTERNAL MEDICINE CLINIC | Age: 32
End: 2017-10-27

## 2017-11-01 DIAGNOSIS — K21.9 GASTROESOPHAGEAL REFLUX DISEASE WITHOUT ESOPHAGITIS: Primary | ICD-10-CM

## 2017-11-01 RX ORDER — NITROGLYCERIN 0.6 MG/1
TABLET SUBLINGUAL
Qty: 20 TAB | Refills: 2 | Status: SHIPPED | OUTPATIENT
Start: 2017-11-01

## 2017-11-13 RX ORDER — DIAZEPAM 2 MG/1
TABLET ORAL
Qty: 30 TAB | Refills: 3 | Status: CANCELLED | OUTPATIENT
Start: 2017-11-13

## 2017-11-20 ENCOUNTER — TELEPHONE (OUTPATIENT)
Dept: INTERNAL MEDICINE CLINIC | Age: 32
End: 2017-11-20

## 2017-11-20 RX ORDER — DIAZEPAM 2 MG/1
TABLET ORAL
Qty: 60 TAB | Refills: 2 | Status: SHIPPED | OUTPATIENT
Start: 2017-11-20 | End: 2017-12-08 | Stop reason: SDUPTHER

## 2017-11-20 NOTE — TELEPHONE ENCOUNTER
Chief Complaint   Patient presents with    Medication Refill     DIAZEPAM     Informed mother that the Rx script is available for  at the .

## 2017-12-07 RX ORDER — DIAZEPAM 2 MG/1
TABLET ORAL
Qty: 60 TAB | Refills: 0 | OUTPATIENT
Start: 2017-12-07

## 2017-12-08 DIAGNOSIS — M62.838 MUSCLE SPASM: Primary | ICD-10-CM

## 2017-12-08 RX ORDER — DIAZEPAM 2 MG/1
TABLET ORAL
Qty: 90 TAB | Refills: 3 | Status: SHIPPED | OUTPATIENT
Start: 2017-12-08 | End: 2018-03-30 | Stop reason: SDUPTHER

## 2017-12-08 NOTE — TELEPHONE ENCOUNTER
Chief Complaint   Patient presents with    Medication Refill     DIAZEPAM     LM to inform the mother that the Rx script is available for  at the .

## 2017-12-12 ENCOUNTER — TELEPHONE (OUTPATIENT)
Dept: INTERNAL MEDICINE CLINIC | Age: 32
End: 2017-12-12

## 2017-12-12 RX ORDER — METOPROLOL TARTRATE 50 MG/1
TABLET ORAL
Qty: 180 TAB | Refills: 1 | Status: SHIPPED | OUTPATIENT
Start: 2017-12-12 | End: 2018-06-25 | Stop reason: SDUPTHER

## 2017-12-12 NOTE — TELEPHONE ENCOUNTER
Chief Complaint   Patient presents with    Medication Refill     OMEPRAZOLE/FAMOTIDINE     Mother informed me that the patient has finished the medication and is currently taking erythromycin ethylsuccinate. She is to follow up with Dr. Lukas Ayon either tomorrow or Thursday to discuss other interventions to help with the patient's gastroparesis. Patient's mother that she will update the patient's progress after her visit with Dr. Lukas Ayon. Sammi Rodriguez notified.

## 2017-12-12 NOTE — TELEPHONE ENCOUNTER
Chief Complaint   Patient presents with    Medication Refill     OMEPRAZOLE/FAMOTIDINE     LM for the mother to return call in regards to which medication is the daughter is currently taking.

## 2018-01-08 ENCOUNTER — TELEPHONE (OUTPATIENT)
Dept: INTERNAL MEDICINE CLINIC | Age: 33
End: 2018-01-08

## 2018-01-08 DIAGNOSIS — G47.00 INSOMNIA, UNSPECIFIED TYPE: Primary | ICD-10-CM

## 2018-01-08 RX ORDER — ZOLPIDEM TARTRATE 10 MG/1
TABLET ORAL
Qty: 30 TAB | Refills: 5 | Status: SHIPPED | OUTPATIENT
Start: 2018-01-08 | End: 2018-06-25 | Stop reason: SDUPTHER

## 2018-01-29 ENCOUNTER — CLINICAL SUPPORT (OUTPATIENT)
Dept: INTERNAL MEDICINE CLINIC | Age: 33
End: 2018-01-29

## 2018-01-29 VITALS
RESPIRATION RATE: 16 BRPM | OXYGEN SATURATION: 97 % | SYSTOLIC BLOOD PRESSURE: 117 MMHG | TEMPERATURE: 97 F | HEIGHT: 55 IN | HEART RATE: 88 BPM | DIASTOLIC BLOOD PRESSURE: 66 MMHG

## 2018-01-29 DIAGNOSIS — D50.9 IRON DEFICIENCY ANEMIA, UNSPECIFIED IRON DEFICIENCY ANEMIA TYPE: Primary | ICD-10-CM

## 2018-01-29 NOTE — PROGRESS NOTES
Chief Complaint   Patient presents with   Rejeana Sherwood Only     CBC W     The patient presents with lab draw only. Blood drawn from right antecubital fossa. No negative reaction noted to site.

## 2018-01-30 ENCOUNTER — PATIENT MESSAGE (OUTPATIENT)
Dept: INTERNAL MEDICINE CLINIC | Age: 33
End: 2018-01-30

## 2018-01-30 DIAGNOSIS — D50.9 IRON DEFICIENCY ANEMIA, UNSPECIFIED IRON DEFICIENCY ANEMIA TYPE: Primary | ICD-10-CM

## 2018-01-30 LAB
BASOPHILS # BLD AUTO: 0 X10E3/UL (ref 0–0.2)
BASOPHILS NFR BLD AUTO: 1 %
EOSINOPHIL # BLD AUTO: 0.3 X10E3/UL (ref 0–0.4)
EOSINOPHIL NFR BLD AUTO: 5 %
ERYTHROCYTE [DISTWIDTH] IN BLOOD BY AUTOMATED COUNT: 18.8 % (ref 12.3–15.4)
HCT VFR BLD AUTO: 31.5 % (ref 34–46.6)
HGB BLD-MCNC: 8.8 G/DL (ref 11.1–15.9)
IMM GRANULOCYTES # BLD: 0 X10E3/UL (ref 0–0.1)
IMM GRANULOCYTES NFR BLD: 0 %
LYMPHOCYTES # BLD AUTO: 1.7 X10E3/UL (ref 0.7–3.1)
LYMPHOCYTES NFR BLD AUTO: 26 %
MCH RBC QN AUTO: 18.7 PG (ref 26.6–33)
MCHC RBC AUTO-ENTMCNC: 27.9 G/DL (ref 31.5–35.7)
MCV RBC AUTO: 67 FL (ref 79–97)
MONOCYTES # BLD AUTO: 0.4 X10E3/UL (ref 0.1–0.9)
MONOCYTES NFR BLD AUTO: 6 %
NEUTROPHILS # BLD AUTO: 4.1 X10E3/UL (ref 1.4–7)
NEUTROPHILS NFR BLD AUTO: 62 %
PLATELET # BLD AUTO: 184 X10E3/UL (ref 150–379)
RBC # BLD AUTO: 4.7 X10E6/UL (ref 3.77–5.28)
WBC # BLD AUTO: 6.6 X10E3/UL (ref 3.4–10.8)

## 2018-02-01 ENCOUNTER — TELEPHONE (OUTPATIENT)
Dept: INTERNAL MEDICINE CLINIC | Age: 33
End: 2018-02-01

## 2018-02-01 ENCOUNTER — OFFICE VISIT (OUTPATIENT)
Dept: INTERNAL MEDICINE CLINIC | Age: 33
End: 2018-02-01

## 2018-02-01 VITALS
DIASTOLIC BLOOD PRESSURE: 58 MMHG | HEART RATE: 82 BPM | HEIGHT: 55 IN | OXYGEN SATURATION: 99 % | RESPIRATION RATE: 18 BRPM | TEMPERATURE: 97.7 F | SYSTOLIC BLOOD PRESSURE: 114 MMHG

## 2018-02-01 DIAGNOSIS — J01.00 ACUTE MAXILLARY SINUSITIS, RECURRENCE NOT SPECIFIED: ICD-10-CM

## 2018-02-01 DIAGNOSIS — D50.9 IRON DEFICIENCY ANEMIA, UNSPECIFIED IRON DEFICIENCY ANEMIA TYPE: ICD-10-CM

## 2018-02-01 DIAGNOSIS — G89.29 CHRONIC MIDLINE LOW BACK PAIN WITHOUT SCIATICA: ICD-10-CM

## 2018-02-01 DIAGNOSIS — J45.20 MILD INTERMITTENT REACTIVE AIRWAY DISEASE WITHOUT COMPLICATION: Primary | ICD-10-CM

## 2018-02-01 DIAGNOSIS — M54.50 CHRONIC MIDLINE LOW BACK PAIN WITHOUT SCIATICA: ICD-10-CM

## 2018-02-01 RX ORDER — TRAMADOL HYDROCHLORIDE 50 MG/1
50 TABLET ORAL
COMMUNITY

## 2018-02-01 RX ORDER — LEVALBUTEROL TARTRATE 45 UG/1
1 AEROSOL, METERED ORAL
Qty: 1 INHALER | Refills: 5 | Status: SHIPPED | OUTPATIENT
Start: 2018-02-01

## 2018-02-01 RX ORDER — FAMOTIDINE 20 MG/1
TABLET, FILM COATED ORAL
COMMUNITY
Start: 2017-11-27 | End: 2018-02-01 | Stop reason: ALTCHOICE

## 2018-02-01 RX ORDER — TRAMADOL HYDROCHLORIDE 50 MG/1
50 TABLET ORAL
COMMUNITY
Start: 2018-02-01

## 2018-02-01 RX ORDER — SULFAMETHOXAZOLE AND TRIMETHOPRIM 800; 160 MG/1; MG/1
1 TABLET ORAL 2 TIMES DAILY
Qty: 20 TAB | Refills: 0 | Status: SHIPPED | OUTPATIENT
Start: 2018-02-01 | End: 2018-02-11

## 2018-02-01 RX ORDER — ERYTHROMYCIN ETHYLSUCCINATE 200 MG/5ML
SUSPENSION ORAL
COMMUNITY
Start: 2018-01-30

## 2018-02-01 RX ORDER — MONTELUKAST SODIUM 10 MG/1
10 TABLET ORAL DAILY
Qty: 15 TAB | Refills: 0 | Status: SHIPPED | OUTPATIENT
Start: 2018-02-01 | End: 2018-02-06 | Stop reason: SDUPTHER

## 2018-02-01 NOTE — TELEPHONE ENCOUNTER
2/1/18 @ 16:39 Called  PA initiated with PA rep Adina Cedillo for Xopenex 45 mcg act inhaler. Auto approval given effective 2/1/18 thru 2/1/19. Case ID: [id number]. [facility] called approval to pharmacist Prieto,stated claim did not go thru,advised to try later as suggested by PA rep,or call plan. Norman Colon verified understanding. Maegan Alaniz

## 2018-02-01 NOTE — PROGRESS NOTES
Chief Complaint   Patient presents with    Labs     FOLLOW UP     1. Have you been to the ER, urgent care clinic since your last visit? Hospitalized since your last visit? Yes When: 2017 Where: [facility]  Reason for visit: Abdominal Pain    2. Have you seen or consulted any other health care providers outside of the [address] [place] [address] since your last visit? Include any pap smears or colon screening. No     There are no preventive care reminders to display for this patient.

## 2018-02-01 NOTE — MR AVS SNAPSHOT
[address] [facility] 
[phone] Patient: Daily Ceja MRN: [id number] ICT:4/8/1988 Visit Information Date & Time Provider Department Dept. Phone Encounter #  
 2/1/2018  1:30 PM Jemal Hinton, 1 [facility] [id number] Follow-up Instructions Return in about 3 months (around 5/1/2018) for anemia. Upcoming Health Maintenance Date Due  
 PAP AKA CERVICAL CYTOLOGY 12/30/2018* DTaP/Tdap/Td series (2 - Td) 7/3/2027 *Topic was postponed. The date shown is not the original due date. Allergies as of 2/1/2018  Review Complete On: 2/1/2018 By: Naveed Bess LPN Severity Noted Reaction Type Reactions Albuterol Sulfate  02/01/2018    Other (comments) Causes Wheezes and throat tightening. Current Immunizations  Never Reviewed No immunizations on file. Not reviewed this visit You Were Diagnosed With   
  
 Codes Comments Mild intermittent reactive airway disease without complication    -  Primary ICD-10-CM: J45.20 ICD-9-CM: 493.90 Chronic midline low back pain without sciatica     ICD-10-CM: M54.5, G89.29 ICD-9-CM: 724.2, 338.29 Chronic maxillary sinusitis     ICD-10-CM: J32.0 ICD-9-CM: 473.0 Vitals BP Pulse Temp Resp Height(growth percentile) 114/58 (BP 1 Location: Left arm, BP Patient Position: Sitting) 82 97.7 °F (36.5 °C) (Temporal) 18 4' 7\" (1.397 m) LMP SpO2 OB Status Smoking Status 01/17/2018 (Approximate) 99% Having regular periods Never Smoker Preferred Pharmacy Pharmacy Name Phone [facility] [address] [facility] [phone] Your Updated Medication List  
  
   
This list is accurate as of: 2/1/18  2:33 PM.  Always use your most recent med list.  
  
  
  
  
 diazePAM 2 mg tablet Commonly known as:  VALIUM  
 TAKE ONE TABLET BY MOUTH EVERY 8 HOURS AS NEEDED FOR ANXIETY AND  OR  MUSCLE  SPASMS  MAX  DAILY  AMOUNT  OF  6MG  
  
 diclofenac 1 % Gel Commonly known as:  VOLTAREN Apply  to affected area four (4) times daily. erythromycin 200 mg/5 mL suspension Commonly known as:  E.E.S.  
  
 ferrous sulfate 325 mg (65 mg iron) EC tablet Commonly known as:  IRON Take 1 Tab by mouth two (2) times a day. levalbuterol tartrate 45 mcg/actuation inhaler Commonly known as:  Elian Griffin Take 1 Puff by inhalation every four (4) hours as needed for Wheezing. metoprolol tartrate 50 mg tablet Commonly known as:  LOPRESSOR  
TAKE ONE TABLET BY MOUTH TWICE DAILY  
  
 mineral oil-hydrophil petrolat ointment Commonly known as:  AQUAPHOR Apply  to affected area as needed for Dry Skin.  
  
 montelukast 10 mg tablet Commonly known as:  SINGULAIR Take 1 Tab by mouth daily for 15 days. nitroglycerin 0.6 mg SL tablet Commonly known as:  [facility] 0.6 mg under the tongue every 5 minutes as needed for chest pain. (if pain persists after 5 min, call 911) May take up to 3 doses Omeprazole Magnesium 20 mg Cpdr  
Take 1 Cap by mouth daily. sertraline 100 mg tablet Commonly known as:  ZOLOFT Take 100 mg by mouth 2 times daily. solifenacin 5 mg tablet Commonly known as:  Felicity Martyr Take 1 Tab by mouth daily. * traMADol 50 mg tablet Commonly known as:  ULTRAM  
Take 50 mg by mouth every eight (8) hours as needed for Pain. * traMADol 50 mg tablet Commonly known as:  ULTRAM  
Take 1 Tab by mouth every six (6) hours as needed for Pain. Max Daily Amount: 200 mg.  
  
 trimethoprim-sulfamethoxazole 160-800 mg per tablet Commonly known as:  BACTRIM DS, SEPTRA DS Take 1 Tab by mouth two (2) times a day for 10 days. zolpidem 10 mg tablet Commonly known as:  AMBIEN  
TAKE ONE TABLET BY MOUTH IN THE EVENING AS NEEDED FOR SLEEP MAX  DAILY  AMOUNT  OF  10MG. * Notice: This list has 2 medication(s) that are the same as other medications prescribed for you. Read the directions carefully, and ask your doctor or other care provider to review them with you. Prescriptions Sent to Pharmacy Refills  
 montelukast (SINGULAIR) 10 mg tablet 0 Sig: Take 1 Tab by mouth daily for 15 days. Class: Normal  
 Pharmacy: [address] [facility], [address] [facility] Ph #: [phone] Route: Oral  
 levalbuterol tartrate (XOPENEX) 45 mcg/actuation inhaler 5 Sig: Take 1 Puff by inhalation every four (4) hours as needed for Wheezing. Class: Normal  
 Pharmacy: [address], [address] [facility] Ph #: [phone] Route: Inhalation  
 trimethoprim-sulfamethoxazole (BACTRIM DS, SEPTRA DS) 160-800 mg per tablet 0 Sig: Take 1 Tab by mouth two (2) times a day for 10 days. Class: Normal  
 Pharmacy: [address], [facility] [address] [facility] Ph #: [phone] Route: Oral  
  
Follow-up Instructions Return in about 3 months (around 5/1/2018) for anemia. To-Do List   
 02/01/2018 Lab:  CBC W/O DIFF Introducing [facility] & [facility] SERVICES! Dear Stephen Wheat: Thank you for requesting a [employer] account. Our records indicate that you already have an active [employer] account. You can access your account anytime at [email or web address] Did you know that you can access your hospital and ER discharge instructions at any time in [employer]? You can also review all of your test results from your hospital stay or ER visit. Additional Information If you have questions, please visit the Frequently Asked Questions section of the [employer] website at [email or web address] Remember, [employer] is NOT to be used for urgent needs. For medical emergencies, dial 911. Now available from your iPhone and Android! Please provide this summary of care documentation to your next provider. Your primary care clinician is listed as Beryle Spatz. If you have any questions after today's visit, please call [phone].

## 2018-02-02 ENCOUNTER — TELEPHONE (OUTPATIENT)
Dept: INTERNAL MEDICINE CLINIC | Age: 33
End: 2018-02-02

## 2018-02-07 RX ORDER — MONTELUKAST SODIUM 10 MG/1
10 TABLET ORAL DAILY
Qty: 30 TAB | Refills: 5 | Status: SHIPPED | OUTPATIENT
Start: 2018-02-07 | End: 2018-02-22

## 2018-02-07 RX ORDER — FERROUS SULFATE 325(65) MG
325 TABLET, DELAYED RELEASE (ENTERIC COATED) ORAL
COMMUNITY
Start: 2018-02-07

## 2018-02-07 RX ORDER — SERTRALINE HYDROCHLORIDE 100 MG/1
TABLET, FILM COATED ORAL
Qty: 180 TAB | Refills: 1 | Status: SHIPPED | OUTPATIENT
Start: 2018-02-07 | End: 2018-07-22 | Stop reason: SDUPTHER

## 2018-02-07 NOTE — TELEPHONE ENCOUNTER
Last office visit:  2/1/18  Last filled :  Singulair 10mg 2/1/18 #15 X no refills                     Zoloft 100mg 9/1/17 #180 X 1 refill  No changes  Follow up 5/1/18 with Kathie Waddell NP

## 2018-02-07 NOTE — PROGRESS NOTES
HISTORY OF PRESENT ILLNESS  Massiel Nuno is a 29 y.o. female. HPI  Chief Complaint   Patient presents with    Labs     FOLLOW UP    Medication Evaluation     ALBUTEROL INHALER. PATIENT STATES TIGHTNESS IN THROAT; WHEEZING. Patient in with parent. Patient in wheelchair. States will have surgery in near future to correct club feet. Patient states use of albuterol causes tightness and wheezing. C/o nasal sinus congestion AND FACIAL pain. Lab Results   Component Value Date/Time    WBC 6.6 01/29/2018 09:15 AM    HGB 8.8 (L) 01/29/2018 09:15 AM    HCT 31.5 (L) 01/29/2018 09:15 AM    PLATELET 528 71/74/1776 09:15 AM       Lab Results   Component Value Date/Time    ALT (SGPT) 15 07/27/2017 08:38 AM    AST (SGOT) 21 07/27/2017 08:38 AM    Creatinine 0.70 07/27/2017 08:38 AM    BUN 14 07/27/2017 08:38 AM    Sodium 138 07/27/2017 08:38 AM    Potassium 4.4 07/27/2017 08:38 AM       Lab Results   Component Value Date/Time    Cholesterol, total 110 07/27/2017 08:38 AM    HDL Cholesterol 36 (L) 07/27/2017 08:38 AM    LDL, calculated 53 07/27/2017 08:38 AM    Triglyceride 105 07/27/2017 08:38 AM    TSH 4.030 07/27/2017 08:38 AM     Anemic and will increase ferrous sulfate. Review of Systems   Constitutional: Negative for chills, fever and malaise/fatigue. HENT: Positive for congestion and sinus pain. Negative for ear pain. Eyes: Negative. Respiratory: Negative. Cardiovascular: Negative. Negative for chest pain and leg swelling. Gastrointestinal: Negative. Genitourinary: Negative. Negative for dysuria and hematuria. Musculoskeletal: Positive for back pain and joint pain. Skin: Negative. Neurological: Negative. Physical Exam   Constitutional: She is oriented to person, place, and time. She appears well-developed and well-nourished. No distress. HENT:   Head: Normocephalic and atraumatic. Positive for pain with palpation of maxillary, frontal, ethmoid sinuses.    Eyes: Conjunctivae are normal.   Cardiovascular: Normal rate, regular rhythm, normal heart sounds and intact distal pulses. Exam reveals no gallop and no friction rub. No murmur heard. Pulmonary/Chest: Effort normal and breath sounds normal. No respiratory distress. She has no wheezes. She has no rales. Abdominal: She exhibits no distension. Musculoskeletal: She exhibits deformity. She exhibits no edema. B club feet. Neurological: She is alert and oriented to person, place, and time. Skin: Skin is warm and dry. She is not diaphoretic. Psychiatric: She has a normal mood and affect. Her behavior is normal. Thought content normal.   Nursing note and vitals reviewed. Plan of care and AVS reviewed with patient who verbalized understanding. ASSESSMENT and PLAN    ICD-10-CM ICD-9-CM    1. Mild intermittent reactive airway disease without complication K70.54 577.43 CBC W/O DIFF   2. Chronic midline low back pain without sciatica M54.5 724.2 traMADol (ULTRAM) 50 mg tablet    G89.29 338.29    3. Acute maxillary sinusitis, recurrence not specified J01.00 461.0 trimethoprim-sulfamethoxazole (BACTRIM DS, SEPTRA DS) 160-800 mg per tablet   4. Iron deficiency anemia, unspecified iron deficiency anemia type D50.9 280.9    Increased ferrous sulfated to TID. Started Bactrim and reviewed SE.   Patient is see pain specialist.  F/u 3 months anemia

## 2018-02-07 NOTE — TELEPHONE ENCOUNTER
From: Littie Goodell  To: Brad Guallpa NP  Sent: 2/6/2018 7:22 PM EST  Subject: Medication Renewal Request    Original authorizing provider: JIM Luna.  Albajohn Grayking would like a refill of the following medications:  sertraline (ZOLOFT) 100 mg tablet Brad Guallpa NP]  montelukast (SINGULAIR) 10 mg tablet Brad Guallpa NP]    Preferred pharmacy: [address] [place] [address], [address]    Comment:

## 2018-02-08 DIAGNOSIS — G89.29 CHRONIC MIDLINE LOW BACK PAIN WITHOUT SCIATICA: ICD-10-CM

## 2018-02-08 DIAGNOSIS — M54.50 CHRONIC MIDLINE LOW BACK PAIN WITHOUT SCIATICA: ICD-10-CM

## 2018-02-08 RX ORDER — TRAMADOL HYDROCHLORIDE 50 MG/1
50 TABLET ORAL
Qty: 60 TAB | Refills: 0 | OUTPATIENT
Start: 2018-02-08

## 2018-02-08 NOTE — TELEPHONE ENCOUNTER
Last office visit:  2/1/18  Last filled:  Tramadol 50mg Q6hrs 2/1/18 (Quantity not listed)  Follow up 5/1/18 with Dk Lu NP

## 2018-02-08 NOTE — TELEPHONE ENCOUNTER
Requested Prescriptions     Pending Prescriptions Disp Refills    traMADol (ULTRAM) 50 mg tablet       Sig: Take 1 Tab by mouth every six (6) hours as needed for Pain. Max Daily Amount: 200 mg.

## 2018-02-15 ENCOUNTER — TELEPHONE (OUTPATIENT)
Dept: INTERNAL MEDICINE CLINIC | Age: 33
End: 2018-02-15

## 2018-02-15 NOTE — TELEPHONE ENCOUNTER
Chief Complaint   Patient presents with    Medication Refill     Chad Stapleton     Patient picked up Rx and has been filled.

## 2018-02-20 ENCOUNTER — TELEPHONE (OUTPATIENT)
Dept: INTERNAL MEDICINE CLINIC | Age: 33
End: 2018-02-20

## 2018-02-20 NOTE — TELEPHONE ENCOUNTER
Chief Complaint   Patient presents with    Appointment     PRE-OP     LM to inform mother that an appointment is required for the pre-op paperwork that needs to be completed.

## 2018-02-27 ENCOUNTER — TELEPHONE (OUTPATIENT)
Dept: INTERNAL MEDICINE CLINIC | Age: 33
End: 2018-02-27

## 2018-02-27 ENCOUNTER — OFFICE VISIT (OUTPATIENT)
Dept: INTERNAL MEDICINE CLINIC | Age: 33
End: 2018-02-27

## 2018-02-27 VITALS
RESPIRATION RATE: 18 BRPM | TEMPERATURE: 97.3 F | HEIGHT: 55 IN | OXYGEN SATURATION: 98 % | HEART RATE: 79 BPM | DIASTOLIC BLOOD PRESSURE: 52 MMHG | SYSTOLIC BLOOD PRESSURE: 92 MMHG

## 2018-02-27 DIAGNOSIS — Z99.3 WHEELCHAIR DEPENDENT: ICD-10-CM

## 2018-02-27 DIAGNOSIS — Q66.89 BILATERAL CLUB FEET: ICD-10-CM

## 2018-02-27 DIAGNOSIS — Z01.811 PRE-OP CHEST EXAM: ICD-10-CM

## 2018-02-27 DIAGNOSIS — Z01.818 PRE-OP EVALUATION: Primary | ICD-10-CM

## 2018-02-27 DIAGNOSIS — D50.9 IRON DEFICIENCY ANEMIA, UNSPECIFIED IRON DEFICIENCY ANEMIA TYPE: ICD-10-CM

## 2018-02-27 DIAGNOSIS — Z13.6 SCREENING FOR CARDIOVASCULAR CONDITION: ICD-10-CM

## 2018-02-27 NOTE — PROGRESS NOTES
HISTORY OF PRESENT ILLNESS  Erasto Banegas is a 29 y.o. female. HPI  Chief Complaint   Patient presents with    Pre-op Exam     Patient in with parent. Patient in wheelchair. Patient in for pre-op evaluation. Past Medical History:   Diagnosis Date    Anemia     Anxiety     2005    Arthrogryposis     Age 4 years   Meghan Grubbs Bilateral club feet     Hernia, paraesophageal     Hiatal hernia     Neurogenic bladder     Neurogenic bowel     S/P hip replacement both hips     [facility]/ July 10 appointment.  Sacral agenesis     Sciatica     After hip surgery    Umbilical hernia     Wheelchair dependent      Social History     Social History    Marital status: SINGLE     Spouse name: N/A    Number of children: N/A    Years of education: N/A     Occupational History    Not on file. Social History Main Topics    Smoking status: Never Smoker    Smokeless tobacco: Never Used    Alcohol use No    Drug use: No    Sexual activity: Not Currently     Other Topics Concern    Not on file     Social History Narrative         Review of Systems   Constitutional: Negative. Negative for chills, fever, malaise/fatigue and weight loss. HENT: Negative. Eyes: Negative. Respiratory: Negative. Negative for cough, shortness of breath and wheezing. Cardiovascular: Negative. Negative for chest pain and leg swelling. Gastrointestinal: Negative for abdominal pain, constipation, diarrhea, nausea and vomiting. Genitourinary: Negative. Musculoskeletal: Negative. Skin: Negative. Negative for rash. Neurological: Negative for dizziness. Physical Exam   Constitutional: She is oriented to person, place, and time. She appears well-developed and well-nourished. No distress. HENT:   Head: Normocephalic and atraumatic. Eyes: Conjunctivae are normal.   Neck: Normal range of motion. Neck supple. No thyromegaly present.    Cardiovascular: Normal rate, regular rhythm, normal heart sounds and intact distal pulses. No murmur heard. Pulmonary/Chest: Effort normal and breath sounds normal. No respiratory distress. She has no wheezes. Abdominal: Soft. She exhibits no distension. There is no tenderness. Musculoskeletal: She exhibits deformity. She exhibits no edema or tenderness. B club feet. Decreased ROM B HIP and B knee joints  Wheelchair dependent. Neurological: She is alert and oriented to person, place, and time. Skin: Skin is warm and dry. She is not diaphoretic. Psychiatric: She has a normal mood and affect. Her behavior is normal.   Nursing note and vitals reviewed. Plan of care and AVS reviewed with patient who verbalized understanding. ASSESSMENT and PLAN    ICD-10-CM ICD-9-CM    1. Pre-op evaluation K26.524 E75.79 METABOLIC PANEL, COMPREHENSIVE      CBC WITH AUTOMATED DIFF      REFERRAL TO CARDIOLOGY   2. Pre-op chest exam Z01.811 V72.82 XR CHEST PA LAT   3. Screening for cardiovascular condition Z13.6 V81.2 AMB POC EKG ROUTINE W/ 12 LEADS, INTER & REP   4. Iron deficiency anemia, unspecified iron deficiency anemia type D50.9 280.9 CBC WITH AUTOMATED DIFF   5. Bilateral club feet Q66.0 754.51     L > R   Will notify patient of lab results. EKG abnormal and referred to Cardiology for clearance for surgery. Will recheck anemia status. F/u prn.

## 2018-02-27 NOTE — PROGRESS NOTES
Chief Complaint   Patient presents with    Pre-op Exam     1. Have you been to the ER, urgent care clinic since your last visit? Hospitalized since your last visit? No    2. Have you seen or consulted any other health care providers outside of the [facility] since your last visit? Include any pap smears or colon screening. No     There are no preventive care reminders to display for this patient.

## 2018-02-27 NOTE — TELEPHONE ENCOUNTER
----- Message from Floridalma Mercado sent at 2/27/2018  1:33 PM EST -----  Regarding: NP Dixon/Telephone  Zeyad Ayon pt's caretaker, is asking if office notes could be faxed to Dr. Vivian Ellison office for an upcoming appt 3/6. The best contact is [phone].     [phone]

## 2018-02-27 NOTE — MR AVS SNAPSHOT
[address] 8773 [facility] 
[phone] Patient: Ivonne Blue MRN: [id number] MYS:[birth year] Visit Information Date & Time Provider Department Dept. Phone Encounter #  
 2/27/2018 10:30 AM [facility] [phone] Follow-up Instructions Return if symptoms worsen or fail to improve. Your Appointments 5/1/2018  1:30 PM  
ROUTINE CARE with [facility] (LIAT SCHEDULING) Appt Note: f/u on medication 2/1/18s[address] [place] [address]  
[phone]  
  
   
 [address] [place] [address] [address] Upcoming Health Maintenance Date Due  
 PAP AKA CERVICAL CYTOLOGY 12/30/2018* DTaP/Tdap/Td series (2 - Td) 7/3/2027 *Topic was postponed. The date shown is not the original due date. Allergies as of 2/27/2018  Review Complete On: 2/27/2018 By: Kojo Miller NP Severity Noted Reaction Type Reactions Albuterol Sulfate  02/01/2018    Other (comments) Causes Wheezes and throat tightening. Current Immunizations  Reviewed on 2/7/2018 No immunizations on file. Not reviewed this visit You Were Diagnosed With   
  
 Codes Comments Pre-op evaluation    -  Primary ICD-10-CM: A68.242 ICD-9-CM: V72.84 Pre-op chest exam     ICD-10-CM: M62.233 ICD-9-CM: V72.82 Screening for cardiovascular condition     ICD-10-CM: Z13.6 ICD-9-CM: V81.2 Iron deficiency anemia, unspecified iron deficiency anemia type     ICD-10-CM: D50.9 ICD-9-CM: 280.9 Bilateral club feet     ICD-10-CM: Q66.0 ICD-9-CM: 754.51 L > R Vitals BP Pulse Temp Resp Height(growth percentile) 92/52 (BP 1 Location: Right arm, BP Patient Position: Sitting) 79 97.3 °F (36.3 °C) (Temporal) 18 4' 7\" (1.397 m) LMP SpO2 OB Status Smoking Status 02/01/2018 (Approximate) 98% Having regular periods Never Smoker Vitals History Preferred Pharmacy Pharmacy Name Phone [facility] [phone] [facility] [phone] Your Updated Medication List  
  
   
This list is accurate as of 2/27/18 11:28 AM.  Always use your most recent med list.  
  
  
  
  
 diazePAM 2 mg tablet Commonly known as:  VALIUM  
TAKE ONE TABLET BY MOUTH EVERY 8 HOURS AS NEEDED FOR ANXIETY AND  OR  MUSCLE  SPASMS  MAX  DAILY  AMOUNT  OF  6MG  
  
 diclofenac 1 % Gel Commonly known as:  VOLTAREN Apply  to affected area four (4) times daily. erythromycin 200 mg/5 mL suspension Commonly known as:  E.E.S.  
  
 ferrous sulfate 325 mg (65 mg iron) EC tablet Commonly known as:  IRON Take 1 Tab by mouth three (3) times daily (with meals). levalbuterol tartrate 45 mcg/actuation inhaler Commonly known as:  Elian Griffin Take 1 Puff by inhalation every four (4) hours as needed for Wheezing. metoprolol tartrate 50 mg tablet Commonly known as:  LOPRESSOR  
TAKE ONE TABLET BY MOUTH TWICE DAILY  
  
 mineral oil-hydrophil petrolat ointment Commonly known as:  AQUAPHOR Apply  to affected area as needed for Dry Skin. nitroglycerin 0.6 mg SL tablet Commonly known as:  Eben [address] 0.6 mg under the tongue every 5 minutes as needed for chest pain. (if pain persists after 5 min, call 911) May take up to 3 doses Omeprazole Magnesium 20 mg Cpdr  
Take 1 Cap by mouth daily. sertraline 100 mg tablet Commonly known as:  ZOLOFT Take 100 mg by mouth 2 times daily. solifenacin 5 mg tablet Commonly known as:  Felicity Martyr Take 1 Tab by mouth daily. * traMADol 50 mg tablet Commonly known as:  ULTRAM  
Take 50 mg by mouth every eight (8) hours as needed for Pain. * traMADol 50 mg tablet Commonly known as:  Errol Merlin  
 Take 1 Tab by mouth every six (6) hours as needed for Pain. Max Daily Amount: 200 mg.  
  
 zolpidem 10 mg tablet Commonly known as:  AMBIEN  
TAKE ONE TABLET BY MOUTH IN THE EVENING AS NEEDED FOR SLEEP MAX  DAILY  AMOUNT  OF  10MG. * Notice: This list has 2 medication(s) that are the same as other medications prescribed for you. Read the directions carefully, and ask your doctor or other care provider to review them with you. We Performed the Following AMB POC EKG ROUTINE W/ 12 LEADS, INTER & REP [95112 CPT(R)] CBC WITH AUTOMATED DIFF [65704 CPT(R)] METABOLIC PANEL, COMPREHENSIVE [72213 CPT(R)] REFERRAL TO CARDIOLOGY [OTJ09 Custom] Comments:  
 Please evaluate patient for cardiac clearance for Left Leg Derotional Tibial Left osteotomy with Plate vs IM Rodding. Follow-up Instructions Return if symptoms worsen or fail to improve. To-Do List   
 02/27/2018 Imaging:  XR CHEST PA LAT Referral Information Referral ID Referred By Referred To  
  
 [id number] Manuel Bowman MD   
   [id number] [facility] [address] [place] [address] Dr Phone: [phone] Fax: [phone] Visits Status Start Date End Date 1 New Request 2/27/18 2/27/19 If your referral has a status of pending review or denied, additional information will be sent to support the outcome of this decision. Patient Instructions Will notify of lab results Introducing [facility]! Dear Sharyle Reeds: Thank you for requesting a [employer] account. Our records indicate that you already have an active [employer] account. You can access your account anytime at [email or web address] Did you know that you can access your hospital and ER discharge instructions at any time in [employer]? You can also review all of your test results from your hospital stay or ER visit. Additional Information If you have questions, please visit the Frequently Asked Questions section of the [employer] website at [email or web address] Remember, [employer] is NOT to be used for urgent needs. For medical emergencies, dial 911. Now available from your iPhone and Android! Please provide this summary of care documentation to your next provider. Your primary care clinician is listed as Ronald Li. If you have any questions after today's visit, please call [phone].

## 2018-02-28 LAB
ALBUMIN SERPL-MCNC: 4.3 G/DL (ref 3.5–5.5)
ALBUMIN/GLOB SERPL: 1.9 {RATIO} (ref 1.2–2.2)
ALP SERPL-CCNC: 70 IU/L (ref 39–117)
ALT SERPL-CCNC: 17 IU/L (ref 0–32)
AST SERPL-CCNC: 21 IU/L (ref 0–40)
BASOPHILS # BLD AUTO: 0 X10E3/UL (ref 0–0.2)
BASOPHILS NFR BLD AUTO: 0 %
BILIRUB SERPL-MCNC: 0.5 MG/DL (ref 0–1.2)
BUN SERPL-MCNC: 17 MG/DL (ref 6–20)
BUN/CREAT SERPL: 26 (ref 9–23)
CALCIUM SERPL-MCNC: 8.9 MG/DL (ref 8.7–10.2)
CHLORIDE SERPL-SCNC: 103 MMOL/L (ref 96–106)
CO2 SERPL-SCNC: 17 MMOL/L (ref 18–29)
CREAT SERPL-MCNC: 0.66 MG/DL (ref 0.57–1)
EOSINOPHIL # BLD AUTO: 0.2 X10E3/UL (ref 0–0.4)
EOSINOPHIL NFR BLD AUTO: 2 %
ERYTHROCYTE [DISTWIDTH] IN BLOOD BY AUTOMATED COUNT: 19.7 % (ref 12.3–15.4)
GFR SERPLBLD CREATININE-BSD FMLA CKD-EPI: 119 ML/MIN/1.73
GFR SERPLBLD CREATININE-BSD FMLA CKD-EPI: 137 ML/MIN/1.73
GLOBULIN SER CALC-MCNC: 2.3 G/DL (ref 1.5–4.5)
GLUCOSE SERPL-MCNC: 78 MG/DL (ref 65–99)
HCT VFR BLD AUTO: 33.6 % (ref 34–46.6)
HGB BLD-MCNC: 9.6 G/DL (ref 11.1–15.9)
IMM GRANULOCYTES # BLD: 0 X10E3/UL (ref 0–0.1)
IMM GRANULOCYTES NFR BLD: 0 %
LYMPHOCYTES # BLD AUTO: 1.5 X10E3/UL (ref 0.7–3.1)
LYMPHOCYTES NFR BLD AUTO: 14 %
MCH RBC QN AUTO: 19.4 PG (ref 26.6–33)
MCHC RBC AUTO-ENTMCNC: 28.6 G/DL (ref 31.5–35.7)
MCV RBC AUTO: 68 FL (ref 79–97)
MONOCYTES # BLD AUTO: 0.7 X10E3/UL (ref 0.1–0.9)
MONOCYTES NFR BLD AUTO: 6 %
NEUTROPHILS # BLD AUTO: 8.6 X10E3/UL (ref 1.4–7)
NEUTROPHILS NFR BLD AUTO: 78 %
PLATELET # BLD AUTO: 174 X10E3/UL (ref 150–379)
POTASSIUM SERPL-SCNC: 4.4 MMOL/L (ref 3.5–5.2)
PROT SERPL-MCNC: 6.6 G/DL (ref 6–8.5)
RBC # BLD AUTO: 4.95 X10E6/UL (ref 3.77–5.28)
SODIUM SERPL-SCNC: 143 MMOL/L (ref 134–144)
WBC # BLD AUTO: 11 X10E3/UL (ref 3.4–10.8)

## 2018-03-06 DIAGNOSIS — J01.00 ACUTE MAXILLARY SINUSITIS, RECURRENCE NOT SPECIFIED: Primary | ICD-10-CM

## 2018-03-06 RX ORDER — AMOXICILLIN 500 MG/1
500 CAPSULE ORAL 2 TIMES DAILY
Qty: 14 CAP | Refills: 0 | Status: SHIPPED | OUTPATIENT
Start: 2018-03-06 | End: 2018-03-13

## 2018-03-16 ENCOUNTER — CLINICAL SUPPORT (OUTPATIENT)
Dept: INTERNAL MEDICINE CLINIC | Age: 33
End: 2018-03-16

## 2018-03-16 VITALS
OXYGEN SATURATION: 95 % | HEIGHT: 55 IN | TEMPERATURE: 97.6 F | SYSTOLIC BLOOD PRESSURE: 105 MMHG | RESPIRATION RATE: 18 BRPM | HEART RATE: 86 BPM | DIASTOLIC BLOOD PRESSURE: 51 MMHG

## 2018-03-16 DIAGNOSIS — D64.9 ANEMIA, UNSPECIFIED TYPE: Primary | ICD-10-CM

## 2018-03-16 NOTE — PROGRESS NOTES
Chief Complaint   Patient presents with    Labs Only     CBC WITH     The patient presents with labs only. Blood drawn from right forearm. No negative reaction noted after 10 minutes.

## 2018-03-19 LAB
BASOPHILS # BLD AUTO: 0 X10E3/UL (ref 0–0.2)
BASOPHILS NFR BLD AUTO: 0 %
EOSINOPHIL # BLD AUTO: 0.2 X10E3/UL (ref 0–0.4)
EOSINOPHIL NFR BLD AUTO: 2 %
ERYTHROCYTE [DISTWIDTH] IN BLOOD BY AUTOMATED COUNT: 19.6 % (ref 12.3–15.4)
HCT VFR BLD AUTO: 36.1 % (ref 34–46.6)
HGB BLD-MCNC: 9.8 G/DL (ref 11.1–15.9)
IMM GRANULOCYTES # BLD: 0 X10E3/UL (ref 0–0.1)
IMM GRANULOCYTES NFR BLD: 0 %
LYMPHOCYTES # BLD AUTO: 1.8 X10E3/UL (ref 0.7–3.1)
LYMPHOCYTES NFR BLD AUTO: 21 %
MCH RBC QN AUTO: 19.1 PG (ref 26.6–33)
MCHC RBC AUTO-ENTMCNC: 27.1 G/DL (ref 31.5–35.7)
MCV RBC AUTO: 71 FL (ref 79–97)
MONOCYTES # BLD AUTO: 0.4 X10E3/UL (ref 0.1–0.9)
MONOCYTES NFR BLD AUTO: 5 %
NEUTROPHILS # BLD AUTO: 6.2 X10E3/UL (ref 1.4–7)
NEUTROPHILS NFR BLD AUTO: 72 %
PLATELET # BLD AUTO: 245 X10E3/UL (ref 150–379)
RBC # BLD AUTO: 5.12 X10E6/UL (ref 3.77–5.28)
SPECIMEN STATUS REPORT, ROLRST: NORMAL
WBC # BLD AUTO: 8.6 X10E3/UL (ref 3.4–10.8)

## 2018-03-22 ENCOUNTER — TELEPHONE (OUTPATIENT)
Dept: INTERNAL MEDICINE CLINIC | Age: 33
End: 2018-03-22

## 2018-03-22 NOTE — TELEPHONE ENCOUNTER
Called and spoke with Catalina Wilcox, mother and informed her that her echocardiogram has arrived to the office. Patient's mother has been informed that her pre-op paperwork has been faxed to the surgeon's office. Informed mother to call, if have any further questions. Understanding verbalized.

## 2018-03-22 NOTE — TELEPHONE ENCOUNTER
Pt's mother called stated that she wanted to know if the pts echo report came in so that dr Ivan Wylie could clear the pt for surgery, she would like a call back to advise if they had been received

## 2018-03-30 DIAGNOSIS — M62.838 MUSCLE SPASM: ICD-10-CM

## 2018-03-30 RX ORDER — DIAZEPAM 2 MG/1
TABLET ORAL
Qty: 90 TAB | Refills: 3 | Status: SHIPPED | OUTPATIENT
Start: 2018-03-30

## 2018-04-02 ENCOUNTER — TELEPHONE (OUTPATIENT)
Dept: INTERNAL MEDICINE CLINIC | Age: 33
End: 2018-04-02

## 2018-04-02 NOTE — TELEPHONE ENCOUNTER
Pts mother would like a doctors note for  saying that her daughter will be handicap for the next 3 years. Please call her at [phone].

## 2018-04-02 NOTE — TELEPHONE ENCOUNTER
Called and spoke with patient's mother and she requests a letter for Social Security in regards to her diagnosis and that she will be handicap for the next 3 years.

## 2018-04-12 ENCOUNTER — TELEPHONE (OUTPATIENT)
Dept: INTERNAL MEDICINE CLINIC | Age: 33
End: 2018-04-12

## 2018-04-12 NOTE — TELEPHONE ENCOUNTER
Pts mother came by wanting to know the status of her daughters letter to  about her disability. Please call her at [phone].

## 2018-04-12 NOTE — TELEPHONE ENCOUNTER
Informed patient's mother that Bam Wood will be on vacation until 04.16.2018 and understanding verbalized.

## 2018-04-17 NOTE — TELEPHONE ENCOUNTER
Chief Complaint   Patient presents with    Letter     Called and LM to inform mother that the letter is available for  at the .

## 2018-04-30 DIAGNOSIS — K21.9 GASTROESOPHAGEAL REFLUX DISEASE WITHOUT ESOPHAGITIS: ICD-10-CM

## 2018-04-30 RX ORDER — CHOLECALCIFEROL (VITAMIN D3) 50 MCG
1 CAPSULE ORAL DAILY
Qty: 30 CAP | Refills: 5 | Status: SHIPPED | OUTPATIENT
Start: 2018-04-30

## 2018-04-30 NOTE — TELEPHONE ENCOUNTER
Requested Prescriptions     Pending Prescriptions Disp Refills    Omeprazole Magnesium 20 mg cpDR 30 Cap 5     Sig: Take 1 Cap by mouth daily.

## 2018-04-30 NOTE — TELEPHONE ENCOUNTER
Requested Prescriptions     Pending Prescriptions Disp Refills    Omeprazole Magnesium 20 mg cpDR 30 Cap 5     Sig: Take 1 Cap by mouth daily.      Future Appointments:  5/11/2018  10:00 AM   Susie Gotti NP             Last Appointment With Me:  2/27/2018   Last Filled:  10.18.2017 + 5 refills  Changes Made to Medication on Last Visit:  None

## 2018-06-24 DIAGNOSIS — N31.9 NEUROGENIC BLADDER: ICD-10-CM

## 2018-06-25 RX ORDER — SOLIFENACIN SUCCINATE 5 MG/1
TABLET, FILM COATED ORAL
Qty: 90 TAB | Refills: 1 | Status: SHIPPED | OUTPATIENT
Start: 2018-06-25

## 2018-06-25 NOTE — TELEPHONE ENCOUNTER
Requested Prescriptions     Pending Prescriptions Disp Refills    VESICARE 5 mg tablet [Pharmacy Med Name: VESICARE 5MG TAB] 90 Tab 1     Sig: TAKE ONE TABLET BY MOUTH ONCE DAILY     Future Appointments:  No future appointments.    Last Appointment With Me:  2/27/2018   Last Filled:  10.18.2017 + 1  Changes Made to Medication on Last Visit:  None

## 2018-06-29 DIAGNOSIS — G47.00 INSOMNIA, UNSPECIFIED TYPE: ICD-10-CM

## 2018-07-02 RX ORDER — ZOLPIDEM TARTRATE 10 MG/1
TABLET ORAL
Qty: 30 TAB | Refills: 5 | OUTPATIENT
Start: 2018-07-02

## 2018-07-02 NOTE — TELEPHONE ENCOUNTER
Requested Prescriptions     Pending Prescriptions Disp Refills    zolpidem (AMBIEN) 10 mg tablet [Pharmacy Med Name: ZOLPIDEM 10MG       TAB] 30 Tab 5     Sig: TAKE ONE TABLET BY MOUTH IN THE EVENING AS NEEDED FOR SLEEP MAX  DAILY  AMOUNT  OF  10MG. Refill has been approved 06.25.2018. Appointment required for additional refills.

## 2018-07-22 DIAGNOSIS — M62.838 MUSCLE SPASM: ICD-10-CM

## 2018-07-22 DIAGNOSIS — G47.00 INSOMNIA, UNSPECIFIED TYPE: ICD-10-CM

## 2018-08-07 DIAGNOSIS — G47.00 INSOMNIA, UNSPECIFIED TYPE: ICD-10-CM

## 2018-08-07 RX ORDER — ZOLPIDEM TARTRATE 10 MG/1
10 TABLET ORAL
Qty: 30 TAB | Refills: 0 | Status: CANCELLED | OUTPATIENT
Start: 2018-08-07

## 2018-08-07 RX ORDER — SERTRALINE HYDROCHLORIDE 100 MG/1
TABLET, FILM COATED ORAL
Qty: 180 TAB | Refills: 1 | Status: CANCELLED | OUTPATIENT
Start: 2018-08-07

## 2018-08-07 NOTE — TELEPHONE ENCOUNTER
Requested Prescriptions     Pending Prescriptions Disp Refills    zolpidem (AMBIEN) 10 mg tablet 30 Tab 0     Sig: Take 1 Tab by mouth nightly. Max Daily Amount: 10 mg. Appointment needed to refill this medication again.  sertraline (ZOLOFT) 100 mg tablet 180 Tab 1     Sig: Take 100 mg by mouth 2 times daily.

## 2018-08-08 RX ORDER — DIAZEPAM 2 MG/1
TABLET ORAL
Qty: 60 TAB | Refills: 0 | OUTPATIENT
Start: 2018-08-08

## 2018-08-08 RX ORDER — ZOLPIDEM TARTRATE 10 MG/1
TABLET ORAL
Qty: 30 TAB | Refills: 0 | OUTPATIENT
Start: 2018-08-08

## 2018-08-08 NOTE — TELEPHONE ENCOUNTER
Last office visit:  2/27/18 Riley Swift  Last filled:  Sertraline 100mg 2/7/18 #180 X 1 refill  No changes  No follow up scheduled    Last filled:  Ambien 10mg 6/25/18 #30 X no refills  No changes  No follow up scheduled    Last filled:  Valium 2mg 3/30/18 #90 X 3 refills  TID PRN dosing  No follow up scheduled  PATIENT NEEDS APPT

## 2018-08-09 RX ORDER — SERTRALINE HYDROCHLORIDE 100 MG/1
TABLET, FILM COATED ORAL
Qty: 60 TAB | Refills: 5 | Status: SHIPPED | OUTPATIENT
Start: 2018-08-09

## 2018-08-24 NOTE — TELEPHONE ENCOUNTER
Requested Prescriptions     Pending Prescriptions Disp Refills    sertraline (ZOLOFT) 100 mg tablet 180 Tab 1     Sig: Take 100 mg by mouth 2 times daily.  zolpidem (AMBIEN) 10 mg tablet 30 Tab 0     Sig: Take 1 Tab by mouth nightly. Max Daily Amount: 10 mg. Appointment needed to refill this medication again. Patient has new PCP.   Moved to [place], [place].

## 2018-09-18 ENCOUNTER — TELEPHONE (OUTPATIENT)
Dept: INTERNAL MEDICINE CLINIC | Age: 33
End: 2018-09-18

## 2018-09-18 NOTE — TELEPHONE ENCOUNTER
Davida Castleman, from home care delivered, called in reference to wanting to know status of fax for pt. She said it was returned and she has faxed it over four times to correct and has not received it yet. Please fax to [phone].

## 2022-03-19 PROBLEM — F11.90 CHRONIC, CONTINUOUS USE OF OPIOIDS: Status: ACTIVE | Noted: 2017-08-22

## 2022-03-19 PROBLEM — R13.10 DYSPHAGIA: Status: ACTIVE | Noted: 2017-09-07

## 2022-03-19 PROBLEM — M54.50 LOW BACK PAIN: Status: ACTIVE | Noted: 2017-07-31

## 2022-03-19 PROBLEM — M54.16 CHRONIC RADICULAR LUMBAR PAIN: Status: ACTIVE | Noted: 2017-08-22

## 2022-03-19 PROBLEM — G89.29 CHRONIC RADICULAR LUMBAR PAIN: Status: ACTIVE | Noted: 2017-08-22

## 2022-03-20 PROBLEM — M25.511 CHRONIC PAIN OF BOTH SHOULDERS: Status: ACTIVE | Noted: 2017-08-22

## 2022-03-20 PROBLEM — M25.512 CHRONIC PAIN OF BOTH SHOULDERS: Status: ACTIVE | Noted: 2017-08-22

## 2022-03-20 PROBLEM — Z99.3 WHEELCHAIR DEPENDENT: Status: ACTIVE | Noted: 2017-09-06

## 2022-03-20 PROBLEM — G89.29 CHRONIC PAIN OF BOTH SHOULDERS: Status: ACTIVE | Noted: 2017-08-22

## 2023-03-13 ENCOUNTER — OFFICE VISIT (OUTPATIENT)
Age: 38
End: 2023-03-13
Payer: MEDICAID

## 2023-03-13 VITALS
OXYGEN SATURATION: 95 % | SYSTOLIC BLOOD PRESSURE: 126 MMHG | HEART RATE: 123 BPM | DIASTOLIC BLOOD PRESSURE: 71 MMHG | TEMPERATURE: 97.1 F | BODY MASS INDEX: 44.2 KG/M2 | WEIGHT: 191 LBS | HEIGHT: 55 IN

## 2023-03-13 DIAGNOSIS — E11.9 TYPE 2 DIABETES MELLITUS WITHOUT COMPLICATION, WITHOUT LONG-TERM CURRENT USE OF INSULIN (HCC): ICD-10-CM

## 2023-03-13 DIAGNOSIS — R15.9 BOWEL AND BLADDER INCONTINENCE: ICD-10-CM

## 2023-03-13 DIAGNOSIS — K31.84 GASTROPARESIS: ICD-10-CM

## 2023-03-13 DIAGNOSIS — E66.01 MORBID OBESITY (HCC): ICD-10-CM

## 2023-03-13 DIAGNOSIS — K21.9 GASTROESOPHAGEAL REFLUX DISEASE, UNSPECIFIED WHETHER ESOPHAGITIS PRESENT: Primary | ICD-10-CM

## 2023-03-13 DIAGNOSIS — R32 BOWEL AND BLADDER INCONTINENCE: ICD-10-CM

## 2023-03-13 DIAGNOSIS — K22.2 ESOPHAGEAL STENOSIS: ICD-10-CM

## 2023-03-13 DIAGNOSIS — E66.01 MORBID OBESITY WITH BMI OF 40.0-44.9, ADULT (HCC): ICD-10-CM

## 2023-03-13 PROBLEM — K42.9 UMBILICAL HERNIA: Status: ACTIVE | Noted: 2023-03-13

## 2023-03-13 PROBLEM — F32.9 MAJOR DEPRESSIVE DISORDER: Status: ACTIVE | Noted: 2023-03-13

## 2023-03-13 PROBLEM — J45.909 ASTHMA: Status: ACTIVE | Noted: 2023-03-13

## 2023-03-13 PROBLEM — D64.9 ANEMIA: Status: ACTIVE | Noted: 2023-03-13

## 2023-03-13 PROBLEM — M54.9 CHRONIC BACK PAIN: Status: ACTIVE | Noted: 2023-03-13

## 2023-03-13 PROBLEM — G43.909 MIGRAINE: Status: ACTIVE | Noted: 2023-03-13

## 2023-03-13 PROBLEM — G47.30 SLEEP APNEA: Status: ACTIVE | Noted: 2023-03-13

## 2023-03-13 PROBLEM — E03.9 HYPOTHYROID: Status: ACTIVE | Noted: 2023-03-13

## 2023-03-13 PROBLEM — E53.8 B12 DEFICIENCY: Status: ACTIVE | Noted: 2023-03-13

## 2023-03-13 PROBLEM — E55.9 VITAMIN D DEFICIENCY: Status: ACTIVE | Noted: 2023-03-13

## 2023-03-13 PROBLEM — F41.9 ANXIETY: Status: ACTIVE | Noted: 2023-03-13

## 2023-03-13 PROBLEM — M54.30 SCIATICA: Status: ACTIVE | Noted: 2023-03-13

## 2023-03-13 PROBLEM — G89.29 CHRONIC BACK PAIN: Status: ACTIVE | Noted: 2023-03-13

## 2023-03-13 PROBLEM — H91.90 HEARING LOSS: Status: ACTIVE | Noted: 2023-03-13

## 2023-03-13 PROBLEM — Q66.89 TALIPES: Status: ACTIVE | Noted: 2023-03-13

## 2023-03-13 PROBLEM — R79.89 ABNORMAL LFTS: Status: ACTIVE | Noted: 2023-03-13

## 2023-03-13 PROCEDURE — 99203 OFFICE O/P NEW LOW 30 MIN: CPT | Performed by: SPECIALIST

## 2023-03-13 RX ORDER — UBIDECARENONE 75 MG
CAPSULE ORAL
COMMUNITY
Start: 2022-12-14

## 2023-03-13 RX ORDER — AMMONIUM LACTATE 12 G/100G
LOTION TOPICAL PRN
COMMUNITY

## 2023-03-13 RX ORDER — SULFAMETHOXAZOLE AND TRIMETHOPRIM 400; 80 MG/1; MG/1
TABLET ORAL
COMMUNITY
Start: 2023-02-07

## 2023-03-13 RX ORDER — OMEPRAZOLE 20 MG/1
CAPSULE, DELAYED RELEASE ORAL
COMMUNITY
End: 2023-03-13 | Stop reason: ALTCHOICE

## 2023-03-13 RX ORDER — MOMETASONE FUROATE 110 UG/1
INHALANT RESPIRATORY (INHALATION)
COMMUNITY
Start: 2023-02-01

## 2023-03-13 RX ORDER — METOPROLOL TARTRATE 50 MG/1
TABLET, FILM COATED ORAL
COMMUNITY
Start: 2023-02-17

## 2023-03-13 RX ORDER — FLUTICASONE PROPIONATE 50 MCG
SPRAY, SUSPENSION (ML) NASAL
COMMUNITY
Start: 2022-05-17

## 2023-03-13 RX ORDER — NALOXONE HYDROCHLORIDE 4 MG/.1ML
SPRAY NASAL
COMMUNITY
Start: 2020-01-22

## 2023-03-13 RX ORDER — EPINEPHRINE 0.3 MG/.3ML
INJECTION SUBCUTANEOUS PRN
COMMUNITY
Start: 2022-08-24 | End: 2023-08-24

## 2023-03-13 RX ORDER — CALCIUM CITRATE/VITAMIN D3 200MG-6.25
TABLET ORAL
COMMUNITY
Start: 2023-02-23

## 2023-03-13 RX ORDER — NYSTATIN 100000 [USP'U]/G
POWDER TOPICAL 2 TIMES DAILY
COMMUNITY
Start: 2021-10-06

## 2023-03-13 RX ORDER — ERYTHROMYCIN ETHYLSUCCINATE 200 MG/5ML
SUSPENSION ORAL
COMMUNITY
Start: 2023-03-04

## 2023-03-13 RX ORDER — LIRAGLUTIDE 6 MG/ML
INJECTION SUBCUTANEOUS DAILY
COMMUNITY
Start: 2020-08-25

## 2023-03-13 RX ORDER — PEN NEEDLE, DIABETIC 32GX 5/32"
NEEDLE, DISPOSABLE MISCELLANEOUS
COMMUNITY
Start: 2022-12-14

## 2023-03-13 RX ORDER — BUSPIRONE HYDROCHLORIDE 15 MG/1
TABLET ORAL
COMMUNITY
Start: 2023-01-25

## 2023-03-13 RX ORDER — MONTELUKAST SODIUM 10 MG/1
TABLET ORAL
COMMUNITY
Start: 2023-02-17

## 2023-03-13 RX ORDER — LEVOTHYROXINE SODIUM 0.12 MG/1
TABLET ORAL
COMMUNITY
Start: 2023-02-23

## 2023-03-13 RX ORDER — AMITRIPTYLINE HYDROCHLORIDE 100 MG/1
TABLET, FILM COATED ORAL
COMMUNITY
Start: 2023-03-08

## 2023-03-13 RX ORDER — ERGOCALCIFEROL 1.25 MG/1
CAPSULE ORAL
COMMUNITY
Start: 2023-02-21

## 2023-03-13 RX ORDER — PANTOPRAZOLE SODIUM 40 MG/1
TABLET, DELAYED RELEASE ORAL
COMMUNITY
Start: 2023-01-09

## 2023-03-13 RX ORDER — FOLIC ACID 1 MG/1
TABLET ORAL
COMMUNITY
Start: 2023-02-23

## 2023-03-13 RX ORDER — PYRAZINAMIDE 500 MG/1
TABLET ORAL
COMMUNITY
Start: 2023-02-12

## 2023-03-13 RX ORDER — FUROSEMIDE 20 MG/1
TABLET ORAL 2 TIMES DAILY
COMMUNITY

## 2023-03-13 RX ORDER — BACLOFEN 10 MG/1
TABLET ORAL
COMMUNITY
Start: 2023-03-04

## 2023-03-13 ASSESSMENT — PATIENT HEALTH QUESTIONNAIRE - PHQ9
SUM OF ALL RESPONSES TO PHQ QUESTIONS 1-9: 0
1. LITTLE INTEREST OR PLEASURE IN DOING THINGS: 0
SUM OF ALL RESPONSES TO PHQ QUESTIONS 1-9: 0
SUM OF ALL RESPONSES TO PHQ QUESTIONS 1-9: 0
SUM OF ALL RESPONSES TO PHQ9 QUESTIONS 1 & 2: 0
SUM OF ALL RESPONSES TO PHQ QUESTIONS 1-9: 0
2. FEELING DOWN, DEPRESSED OR HOPELESS: 0

## 2023-03-13 NOTE — PROGRESS NOTES
Today the patient presented with her mom with her diagnosis of morbid obesity. She has a significant history of having a congenital spinal disorder which is required multiple procedures in the past.  She in addition as an infant approximately 3 year of age underwent a Nissen fundoplication which had to be redone in 2021 at the [address] [place] [address]. She now carries a diagnosis of Herndon's esophagus and although she has had some recent luck with weight loss she is now presenting for consideration for weight loss surgery. She continues to have multiple medical problems which include pulmonary issues related to her very short stature and obesity which is created a restrictive airway issue. Her endocrinologist who treats her diabetes has had her on Victoza and she was able to lose approximately 30 pounds with the Victoza but has had about a 5 pound weight regain. Today have spent approximately 30 minutes discussing the various weight loss surgical procedures available to patients in general.  We have in addition discussed her history of having had a Nissen fundoplication now twice and also her persistent health problems which are not improving. I have in addition discussed with her her dietary habits which includes consuming anywhere from 2-3 sprites per day in addition to a moderate amount of carbohydrates on a daily basis. My current recommendation for this patient is that she is not a candidate for weight loss surgery due to her prior Nissen fundoplication and all of her persistent health issues. I have recommended to them that they not consider weight loss surgery as an option but have given them the alternative of being referred to [facility] to have them review her case to see if they are amenable to a gastric bypass procedure.   I have explained to them why she is not a candidate for the sleeve gastrectomy and also the significant risk potential to take down a Nissen fundoplication and proceeding with a gastric bypass procedure.      At this juncture they would prefer not to proceed with any surgical intervention since she has had so many operative procedures in her past.  They will discuss her current diabetic treatment plan with her endocrinologist and continue along a medical weight loss program.    Total time spent with the patient was approximately 35 minutes

## 2023-12-27 ENCOUNTER — HOSPITAL ENCOUNTER (OUTPATIENT)
Facility: HOSPITAL | Age: 38
Discharge: HOME | End: 2023-12-30
Payer: MEDICAID

## 2023-12-27 ENCOUNTER — HOSPITAL ENCOUNTER (OUTPATIENT)
Facility: HOSPITAL | Age: 38
Discharge: HOME | End: 2023-12-30

## 2023-12-27 LAB
ANION GAP SERPL CALC-SCNC: 7 MMOL/L (ref 3–18)
BUN SERPL-MCNC: 13 MG/DL (ref 7–18)
BUN/CREAT SERPL: 20 (ref 12–20)
CALCIUM SERPL-MCNC: 9.1 MG/DL (ref 8.5–10.1)
CHLORIDE SERPL-SCNC: 105 MMOL/L (ref 100–111)
CO2 SERPL-SCNC: 26 MMOL/L (ref 21–32)
CREAT SERPL-MCNC: 0.64 MG/DL (ref 0.6–1.3)
GLUCOSE SERPL-MCNC: 161 MG/DL (ref 74–99)
HCT VFR BLD AUTO: 44.6 % (ref 35–45)
HGB BLD-MCNC: 14.4 G/DL (ref 12–16)
POTASSIUM SERPL-SCNC: 3.9 MMOL/L (ref 3.5–5.5)
SODIUM SERPL-SCNC: 138 MMOL/L (ref 136–145)

## 2023-12-27 PROCEDURE — 93005 ELECTROCARDIOGRAM TRACING: CPT | Performed by: PODIATRIST

## 2023-12-27 PROCEDURE — 85014 HEMATOCRIT: CPT

## 2023-12-27 PROCEDURE — 36415 COLL VENOUS BLD VENIPUNCTURE: CPT

## 2023-12-27 PROCEDURE — 85018 HEMOGLOBIN: CPT

## 2023-12-27 PROCEDURE — 80048 BASIC METABOLIC PNL TOTAL CA: CPT

## 2023-12-28 LAB
EKG ATRIAL RATE: 94 BPM
EKG DIAGNOSIS: NORMAL
EKG P AXIS: 33 DEGREES
EKG P-R INTERVAL: 134 MS
EKG Q-T INTERVAL: 348 MS
EKG QRS DURATION: 84 MS
EKG QTC CALCULATION (BAZETT): 435 MS
EKG R AXIS: -8 DEGREES
EKG T AXIS: -24 DEGREES
EKG VENTRICULAR RATE: 94 BPM

## 2024-01-03 ENCOUNTER — ANESTHESIA EVENT (OUTPATIENT)
Facility: HOSPITAL | Age: 39
End: 2024-01-03
Payer: MEDICAID

## 2024-01-10 NOTE — H&P
[facility]  HISTORY AND PHYSICAL    Name:  SIMA ABAD  MR#:   [id number]  :  1985  ACCOUNT #:  [id number]  ADMIT DATE:  2024      PREOPERATIVE HISTORY AND PHYSICAL    She is scheduled for surgery on 2024 at [facility].    HISTORY OF PRESENT ILLNESS:  The patient presented today for scheduled first MPJ fusion on her right foot.  There is contracture of the great toe which is causing difficulty while in shoe gear.  She relates it is tender and causing pain and she presented today for scheduled surgical management.    PAST MEDICAL HISTORY:  Include:  1.  Asthma.  2.  Chronic pain syndrome.  3.  Depression.  4.  Diabetes.  5.  History of dislocated joint.  6.  DVT.  7.  Hiatal hernia.  8.  Kidney stones.  9.  Obesity.  10.  Myofascial pain.  11.  Sacral degenerative scoliosis.    CURRENT MEDICATIONS:  Include:  1.  Amitriptyline.  2.  Baclofen.  3.  Buspirone.  4.  Vitamin B12.  5.  Flovent inhaler.  6.  Folic acid.  7.  Furosemide.  8.  Hydroxyzine.  9.  Levothyroxine.  10.  Victoza.  11.  Metformin.  12.  Metoprolol.  13.  Nitroglycerin.  14.  Pantoprazole.    ALLERGIES:  NO KNOWN DRUG ALLERGIES.    PAST SURGICAL HISTORY:  Unremarkable to the chief complaint.    SOCIAL HISTORY:  Denies alcohol and tobacco products.    FAMILY HISTORY:  Unremarkable to the chief complaint.    PHYSICAL EXAMINATION:  VASCULAR:  Dorsalis pedis and poster tibial pulses are palpable.  Cap fill time less than 3 seconds.  NEUROLOGIC:  All epicritic sensations are intact but diminished.  There is contracture of the great toe which is limited; it is painful and not reducible.  There is no open wounds, no drainage, no clinical signs of infection.  Muscle strength testing deferred.    IMPRESSION:  1.  Hallux limitus, right.  2.  Degenerative arthritis, right.    PLAN:  I have consulted the patient.  We have discussed options and alternatives and recommendations were given.  She was given a full

## 2024-01-11 RX ORDER — HYDROMORPHONE HYDROCHLORIDE 1 MG/ML
0.5 INJECTION, SOLUTION INTRAMUSCULAR; INTRAVENOUS; SUBCUTANEOUS EVERY 5 MIN PRN
Status: CANCELLED | OUTPATIENT
Start: 2024-01-11

## 2024-01-11 RX ORDER — SODIUM CHLORIDE 0.9 % (FLUSH) 0.9 %
5-40 SYRINGE (ML) INJECTION EVERY 12 HOURS SCHEDULED
Status: CANCELLED | OUTPATIENT
Start: 2024-01-11

## 2024-01-11 RX ORDER — SODIUM CHLORIDE 9 MG/ML
INJECTION, SOLUTION INTRAVENOUS PRN
Status: CANCELLED | OUTPATIENT
Start: 2024-01-11

## 2024-01-11 RX ORDER — SODIUM CHLORIDE 0.9 % (FLUSH) 0.9 %
5-40 SYRINGE (ML) INJECTION PRN
Status: CANCELLED | OUTPATIENT
Start: 2024-01-11

## 2024-01-11 RX ORDER — FENTANYL CITRATE 50 UG/ML
25 INJECTION, SOLUTION INTRAMUSCULAR; INTRAVENOUS EVERY 5 MIN PRN
Status: CANCELLED | OUTPATIENT
Start: 2024-01-11

## 2024-01-12 ENCOUNTER — HOSPITAL ENCOUNTER (OUTPATIENT)
Facility: HOSPITAL | Age: 39
Setting detail: OUTPATIENT SURGERY
Discharge: HOME | End: 2024-01-12
Attending: PODIATRIST | Admitting: PODIATRIST
Payer: MEDICAID

## 2024-01-12 ENCOUNTER — ANESTHESIA (OUTPATIENT)
Facility: HOSPITAL | Age: 39
End: 2024-01-12
Payer: MEDICAID

## 2024-01-12 VITALS
WEIGHT: 200.1 LBS | HEART RATE: 87 BPM | RESPIRATION RATE: 15 BRPM | TEMPERATURE: 97.8 F | OXYGEN SATURATION: 98 % | BODY MASS INDEX: 46.31 KG/M2 | DIASTOLIC BLOOD PRESSURE: 64 MMHG | HEIGHT: 55 IN | SYSTOLIC BLOOD PRESSURE: 117 MMHG

## 2024-01-12 LAB
GLUCOSE BLD STRIP.AUTO-MCNC: 128 MG/DL (ref 70–110)
HCG SERPL QL: NEGATIVE

## 2024-01-12 PROCEDURE — 2580000003 HC RX 258: Performed by: PODIATRIST

## 2024-01-12 PROCEDURE — 6360000002 HC RX W HCPCS: Performed by: NURSE ANESTHETIST, CERTIFIED REGISTERED

## 2024-01-12 PROCEDURE — 2709999900 HC NON-CHARGEABLE SUPPLY: Performed by: PODIATRIST

## 2024-01-12 PROCEDURE — 6360000002 HC RX W HCPCS: Performed by: PODIATRIST

## 2024-01-12 PROCEDURE — 7100000010 HC PHASE II RECOVERY - FIRST 15 MIN: Performed by: PODIATRIST

## 2024-01-12 PROCEDURE — C1713 ANCHOR/SCREW BN/BN,TIS/BN: HCPCS | Performed by: PODIATRIST

## 2024-01-12 PROCEDURE — 2720000010 HC SURG SUPPLY STERILE: Performed by: PODIATRIST

## 2024-01-12 PROCEDURE — A4217 STERILE WATER/SALINE, 500 ML: HCPCS | Performed by: PODIATRIST

## 2024-01-12 PROCEDURE — 84703 CHORIONIC GONADOTROPIN ASSAY: CPT

## 2024-01-12 PROCEDURE — 3700000001 HC ADD 15 MINUTES (ANESTHESIA): Performed by: PODIATRIST

## 2024-01-12 PROCEDURE — 3600000012 HC SURGERY LEVEL 2 ADDTL 15MIN: Performed by: PODIATRIST

## 2024-01-12 PROCEDURE — 7100000011 HC PHASE II RECOVERY - ADDTL 15 MIN: Performed by: PODIATRIST

## 2024-01-12 PROCEDURE — 3600000002 HC SURGERY LEVEL 2 BASE: Performed by: PODIATRIST

## 2024-01-12 PROCEDURE — C1769 GUIDE WIRE: HCPCS | Performed by: PODIATRIST

## 2024-01-12 PROCEDURE — 36415 COLL VENOUS BLD VENIPUNCTURE: CPT

## 2024-01-12 PROCEDURE — 3700000000 HC ANESTHESIA ATTENDED CARE: Performed by: PODIATRIST

## 2024-01-12 PROCEDURE — 82962 GLUCOSE BLOOD TEST: CPT

## 2024-01-12 DEVICE — IMPLANTABLE DEVICE
Type: IMPLANTABLE DEVICE | Site: FOOT | Status: FUNCTIONAL
Brand: ORTHOLOC 3DI

## 2024-01-12 DEVICE — MTP FUSION PLATE
Type: IMPLANTABLE DEVICE | Site: FOOT | Status: FUNCTIONAL
Brand: ORTHOLOC 3DI

## 2024-01-12 DEVICE — LOCKING LG HD SCREW 2.7X12MM ORTHOLOC™ 3DI PLATING SYSTEM
Type: IMPLANTABLE DEVICE | Site: FOOT | Status: FUNCTIONAL
Brand: ORTHOLOC 3DI

## 2024-01-12 DEVICE — IMPLANTABLE DEVICE
Type: IMPLANTABLE DEVICE | Site: FOOT | Status: FUNCTIONAL
Brand: ORTHOLOC

## 2024-01-12 RX ORDER — ONDANSETRON 2 MG/ML
INJECTION INTRAMUSCULAR; INTRAVENOUS PRN
Status: DISCONTINUED | OUTPATIENT
Start: 2024-01-12 | End: 2024-01-12 | Stop reason: SDUPTHER

## 2024-01-12 RX ORDER — CHLORHEXIDINE GLUCONATE 4 G/100ML
SOLUTION TOPICAL ONCE
Status: DISCONTINUED | OUTPATIENT
Start: 2024-01-12 | End: 2024-01-12 | Stop reason: HOSPADM

## 2024-01-12 RX ORDER — MIDAZOLAM HYDROCHLORIDE 1 MG/ML
INJECTION INTRAMUSCULAR; INTRAVENOUS PRN
Status: DISCONTINUED | OUTPATIENT
Start: 2024-01-12 | End: 2024-01-12 | Stop reason: SDUPTHER

## 2024-01-12 RX ORDER — SODIUM CHLORIDE, SODIUM LACTATE, POTASSIUM CHLORIDE, CALCIUM CHLORIDE 600; 310; 30; 20 MG/100ML; MG/100ML; MG/100ML; MG/100ML
INJECTION, SOLUTION INTRAVENOUS CONTINUOUS
Status: DISCONTINUED | OUTPATIENT
Start: 2024-01-12 | End: 2024-01-12 | Stop reason: HOSPADM

## 2024-01-12 RX ORDER — FENTANYL CITRATE 50 UG/ML
INJECTION, SOLUTION INTRAMUSCULAR; INTRAVENOUS PRN
Status: DISCONTINUED | OUTPATIENT
Start: 2024-01-12 | End: 2024-01-12 | Stop reason: SDUPTHER

## 2024-01-12 RX ORDER — PROPOFOL 10 MG/ML
INJECTION, EMULSION INTRAVENOUS PRN
Status: DISCONTINUED | OUTPATIENT
Start: 2024-01-12 | End: 2024-01-12 | Stop reason: SDUPTHER

## 2024-01-12 RX ORDER — LIDOCAINE HYDROCHLORIDE 20 MG/ML
INJECTION, SOLUTION INTRAVENOUS PRN
Status: DISCONTINUED | OUTPATIENT
Start: 2024-01-12 | End: 2024-01-12 | Stop reason: SDUPTHER

## 2024-01-12 RX ADMIN — PROPOFOL 50 MCG/KG/MIN: 10 INJECTION, EMULSION INTRAVENOUS at 07:44

## 2024-01-12 RX ADMIN — WATER 2000 MG: 1 INJECTION INTRAMUSCULAR; INTRAVENOUS; SUBCUTANEOUS at 07:47

## 2024-01-12 RX ADMIN — ONDANSETRON 4 MG: 2 INJECTION INTRAMUSCULAR; INTRAVENOUS at 08:08

## 2024-01-12 RX ADMIN — MIDAZOLAM 2 MG: 1 INJECTION INTRAMUSCULAR; INTRAVENOUS at 07:35

## 2024-01-12 RX ADMIN — FENTANYL CITRATE 25 MCG: 50 INJECTION, SOLUTION INTRAMUSCULAR; INTRAVENOUS at 08:01

## 2024-01-12 RX ADMIN — FENTANYL CITRATE 25 MCG: 50 INJECTION, SOLUTION INTRAMUSCULAR; INTRAVENOUS at 08:15

## 2024-01-12 RX ADMIN — LIDOCAINE HYDROCHLORIDE 40 MG: 20 INJECTION, SOLUTION INTRAVENOUS at 07:42

## 2024-01-12 RX ADMIN — FENTANYL CITRATE 50 MCG: 50 INJECTION, SOLUTION INTRAMUSCULAR; INTRAVENOUS at 07:40

## 2024-01-12 RX ADMIN — SODIUM CHLORIDE, POTASSIUM CHLORIDE, SODIUM LACTATE AND CALCIUM CHLORIDE: 600; 310; 30; 20 INJECTION, SOLUTION INTRAVENOUS at 06:44

## 2024-01-12 RX ADMIN — PROPOFOL 70 MG: 10 INJECTION, EMULSION INTRAVENOUS at 07:42

## 2024-01-12 ASSESSMENT — PAIN - FUNCTIONAL ASSESSMENT: PAIN_FUNCTIONAL_ASSESSMENT: 0-10

## 2024-01-12 NOTE — PERIOP NOTE
TRANSFER - IN REPORT:    Verbal report received from Katelyn Huang RN on Zohra Castillo  being received from OR for routine progression of patient care      Report consisted of patient's Situation, Background, Assessment and   Recommendations(SBAR).     Information from the following report(s) Intake/Output and MAR was reviewed with the receiving nurse.    Opportunity for questions and clarification was provided.      Assessment completed upon patient's arrival to unit and care assumed.

## 2024-01-12 NOTE — OP NOTE
[facility]  OPERATIVE REPORT    Name:  SIMA ABAD  MR#:   [id number]  :  1985  ACCOUNT #:  [id number]  DATE OF SERVICE:  2024      PREOPERATIVE DIAGNOSES:  1.  Hallux limitus, right.  2.  Degenerative arthritis, right.    POSTOPERATIVE DIAGNOSES:  1.  Hallux limitus, right.  2.  Degenerative arthritis, right.    PROCEDURE PERFORMED:  First MPJ fusion, right.    SURGEON:  Vikram Richey DPM    ASSISTANT:  Boy Gray.    ANESTHESIA:  IV sedation with 20 mL of 0.5% Marcaine plain.    COMPLICATIONS:  None.    SPECIMENS REMOVED:  Pathology, bone.    IMPLANTS/MATERIALS:  One small 0 degree [place] first MPJ plate, two 2.7 x 16 mm locking screws, one 2.7 x 16 mm non-locking screw, two 2.7 x 14 mm locking screws, one 2.7 x 12 mm locking screw.  3-0 Monocryl, 4-0 Monocryl, and 4-0 nylon.    ESTIMATED BLOOD LOSS:  Minimal.    HEMOSTASIS:  Ankle tourniquet at 250 mmHg.    The patient tolerated the procedure and anesthesia without complication and left the operating room with vascular status intact and vital signs stable.    PROCEDURE:  The patient was brought to the operating room and placed on the table in supine position.  After administration and achievement of IV sedation, the aforementioned local anesthesia was infiltrated in the form of a local block.  Ankle tourniquet was placed on a well-padded area on the right ankle.  The patient's foot and leg were prepped and draped in the usual sterile fashion.  The leg was elevated and exsanguinated, and the tourniquet was inflated.  Attention was then directed to the first MPJ where a 5-cm incision was made.  Incision was taken down through subcutaneous tissue making sure to identify, retract and preserve all neurovascular and tendon structures encountered.  A linear periosteal and capsular incision were and this was reflected to expose the first MPJ.  There was significant discoloration of the joint cartilage.  At this time,

## 2024-01-12 NOTE — PERIOP NOTE
Discharge instructions given to patient and caregiver. Written instructions given to take home. Verbal understanding given by patient and caregiver. ID band removed before leaving.

## 2024-01-12 NOTE — PROGRESS NOTES
Patient is s/p 1st MPJ fusion. All risks benefits discussed and all options explained she was given opportunity to ask questions and all answered.

## 2024-01-12 NOTE — ANESTHESIA POSTPROCEDURE EVALUATION
Department of Anesthesiology  Postprocedure Note    Patient: Zohra Castillo  MRN: [id number]  YOB: 1985  Date of evaluation: 1/12/2024    Procedure Summary       Date: 01/12/24 Room / Location: [facility] MAIN 05 / [facility] MAIN OR    Anesthesia Start: 0734 Anesthesia Stop: 0838    Procedure: RIGHT FOOT FIRST METAPHALANGEAL JOINT FUSION WITH C-ARM \"SPEC POP\" (Right: Foot) Diagnosis:       HL (hallux limitus), right      Osteoarthritis of right ankle, unspecified osteoarthritis type      (HL (hallux limitus), right [M20.5X1])      (Osteoarthritis of right ankle, unspecified osteoarthritis type [M19.071])    Surgeons: Vikram Richey DPM Responsible Provider: Rocio Nixon MD    Anesthesia Type: MAC ASA Status: 3            Anesthesia Type: MAC    Mare Phase I:      Mare Phase II: Mare Score: 10    Anesthesia Post Evaluation    Patient location during evaluation: PACU  Patient participation: complete - patient participated  Level of consciousness: awake  Pain score: 0  Airway patency: patent  Nausea & Vomiting: no nausea and no vomiting  Cardiovascular status: blood pressure returned to baseline  Respiratory status: acceptable  Hydration status: stable  Multimodal analgesia pain management approach  Pain management: satisfactory to patient    No notable events documented.

## 2024-01-12 NOTE — DISCHARGE INSTRUCTIONS
Post op instructions     Non weight bearing with crutches  Keep leg elevated  Ice to ankle 30 minutes on 30 minutes off for 3 days     JAMES PITT, MDDischarge Instructions  JAMES PITT  Foot  Procedures    You are being discharged after a procedure that has reconstructed a portion of your forefoot. Please continue to use your walker or crutches as long as you need them. Typically, you will be able to begin putting your heel on the floor for weight-bearing in a day or two. However, lead with the foot that has been reconstructed and stop with your good leg before you put any weight on the toes of the operative foot.    Dressing care-  You will need to keep your foot elevated for the next several days. Let your comfort help you decide how long your foot can be down over the next few days. During the first few days, your foot will feel tight and uncomfortable if it is down too long. It is normal for you to see a little blood staining on the dressing over your incision sites. It will be necessary to keep the dressing dry at all times. NEVER remove your dressing as it has been applied to hold your foot in the corrected position. Failure to follow this instruction may result in serious compromise of your correction.  Medications- Please use your pain medication as directed. Refills can only be obtained during normal office hours. It is always best in call before noon. Please use one aspirin a day (81 mg for 325 mg) for the first few months until you resume normal activity on your leg.    Cautions- please report the following:  #1- unusual pain or pressure inside the dressing  #2- fever above 100.5  #3- any chest pain or shortness of breath  #4- any reactions to your medications that would necessitate a change in your medication  Call 911 for any serious life-threatening emergencies    Follow up appointments have already been arranged for you as follows:  Please call the office at [phone] if you need to change any  symptoms:  Weight gain of 3 pounds or more overnight or 5 pounds in a week, increased swelling in our hands or feet or shortness of breath while lying flat in bed.  Please call your doctor as soon as you notice any of these symptoms; do not wait until your next office visit.    Patient armband removed and shredded     The discharge information has been reviewed with the patient and caregiver.  The patient and caregiver verbalized understanding.  Discharge medications reviewed with the patient and caregiver and appropriate educational materials and side effects teaching were provided.  ___________________________________________________________________________________________________________________________________

## 2024-01-12 NOTE — ANESTHESIA PRE PROCEDURE
Department of Anesthesiology  Preprocedure Note       Name:  Zohra Castillo   Age:  38 y.o.  :  1985                                          MRN:  [id number]         Date:  2024      Surgeon: Surgeon(s):  Vikram Richey DPM    Procedure: Procedure(s):  RIGHT FOOT FIRST METAPHALANGEAL JOINT FUSION WITH C-ARM \"SPEC POP\"    Medications prior to admission:   Prior to Admission medications    Medication Sig Start Date End Date Taking? Authorizing Provider   Semaglutide,0.25 or 0.5MG/DOS, (OZEMPIC, 0.25 OR 0.5 MG/DOSE,) 2 MG/1.5ML SOPN Inject into the skin once a week Indications: weight lose; wednesday; do not use within 7 days of dos    Mary Uriarte MD   acetaminophen-codeine (TYLENOL #3) 300-30 MG per tablet TAKE ONE TABLET BY MOUTH THREE TIMES DAILY AS NEEDED FOR SEVERE PAIN ICD 10 G89.4 (84) 23   Mary Uriarte MD   amitriptyline (ELAVIL) 100 MG tablet nightly Indications: anxiety 3/8/23   Mary Uriarte MD   ammonium lactate (LAC-HYDRIN) 12 % lotion Apply topically as needed    Mary Uriarte MD   baclofen (LIORESAL) 10 MG tablet TAKE ONE TABLET BY MOUTH TWICE DAILY AS NEEDED FOR MUSCLE SPASMS 3/4/23   Mary Uriarte MD   busPIRone (BUSPAR) 15 MG tablet TAKE 1 TABLET (15 MG TOTAL) BY MOUTH IN THE MORNING AND AT BEDTIME 23   Mary Uriarte MD   vitamin B-12 (CYANOCOBALAMIN) 100 MCG tablet TAKE 0.5 TABLETS (50 MCG TOTAL) BY MOUTH DAILY 22   Mary Uriarte MD   vitamin D (ERGOCALCIFEROL) 1.25 MG (35845 UT) CAPS capsule TAKE 1 CAPSULE BY MOUTH EVERY 14 DAYS ON 23   Mary Uriarte MD   erythromycin (EES) 200 MG/5ML suspension Indications: gastroparesis 3/4/23   Mary Uriarte MD   fluticasone (FLONASE) 50 MCG/ACT nasal spray SPRAY 2 SPRAYS INTO EACH NOSTRIL EVERY DAY 22   Mary Uriarte MD   folic acid (FOLVITE) 1 MG tablet TAKE 1 TABLET BY MOUTH EVERY DAY 23   Mary Uriarte, MD

## 2024-01-12 NOTE — PERIOP NOTE
Called [facility] for transportation ride home [phone], [phone] trip number, ride to arrive in 1.5-2 hours, nurses station number given for  to call

## 2024-09-09 ENCOUNTER — HOSPITAL ENCOUNTER (OUTPATIENT)
Facility: HOSPITAL | Age: 39
Discharge: HOME | End: 2024-09-12

## 2024-09-09 ENCOUNTER — HOSPITAL ENCOUNTER (OUTPATIENT)
Facility: HOSPITAL | Age: 39
Discharge: HOME | End: 2024-09-11
Payer: MEDICAID

## 2024-09-09 ENCOUNTER — TRANSCRIBE ORDERS (OUTPATIENT)
Facility: HOSPITAL | Age: 39
End: 2024-09-09

## 2024-09-09 DIAGNOSIS — T84.84XA PAIN IN PROSTHETIC JOINT, INITIAL ENCOUNTER (HCC): ICD-10-CM

## 2024-09-09 DIAGNOSIS — Z01.812 PRE-OPERATIVE LABORATORY EXAMINATION: ICD-10-CM

## 2024-09-09 DIAGNOSIS — Z01.812 PRE-OPERATIVE LABORATORY EXAMINATION: Primary | ICD-10-CM

## 2024-09-09 LAB
EKG ATRIAL RATE: 110 BPM
EKG DIAGNOSIS: NORMAL
EKG P AXIS: 38 DEGREES
EKG P-R INTERVAL: 130 MS
EKG Q-T INTERVAL: 320 MS
EKG QRS DURATION: 80 MS
EKG QTC CALCULATION (BAZETT): 433 MS
EKG R AXIS: 25 DEGREES
EKG T AXIS: 10 DEGREES
EKG VENTRICULAR RATE: 110 BPM
LABCORP SPECIMEN COLLECTION: NORMAL

## 2024-09-09 PROCEDURE — 93005 ELECTROCARDIOGRAM TRACING: CPT

## 2024-09-09 PROCEDURE — 99001 SPECIMEN HANDLING PT-LAB: CPT

## (undated) DEVICE — PADDING,UNDERCAST,COTTON, 4"X4YD STERILE: Brand: MEDLINE

## (undated) DEVICE — GARMENT,MEDLINE,DVT,INT,CALF,MED, GEN2: Brand: MEDLINE

## (undated) DEVICE — Device

## (undated) DEVICE — SUTURE ETHLN SZ 4-0 L18IN NONABSORBABLE BLK L19MM PS-2 3/8 1667H

## (undated) DEVICE — SUTURE MCRYL SZ 3-0 L27IN ABSRB UD PS-2 3/8 CIR REV CUT NDL MCP427H

## (undated) DEVICE — 4.0MM EGG BUR

## (undated) DEVICE — SUTURE ABSORBABLE MONOFILAMENT 2-0 SH 27 IN VIO MONOCRYL + MCP317H

## (undated) DEVICE — GLOVE ORANGE PI 7 1/2   MSG9075

## (undated) DEVICE — UNTHREADED GUIDE WIRE: Brand: FIXOS

## (undated) DEVICE — SUTURE MCRYL + SZ 4-0 L27IN ABSRB UD L19MM PS-2 3/8 CIR MCP426H

## (undated) DEVICE — PACK PROCEDURE SURG EXTREMITY CUST

## (undated) DEVICE — DRAPE EQUIP CARM MINI STRL

## (undated) DEVICE — SUTURE VCRL + SZ 3-0 L27IN ABSRB UD L26MM SH 1/2 CIR VCP416H

## (undated) DEVICE — APPLICATOR MEDICATED 26 CC SOLUTION HI LT ORNG CHLORAPREP

## (undated) DEVICE — HYPODERMIC SAFETY NEEDLE: Brand: MAGELLAN

## (undated) DEVICE — SOLUTION IRRIG 500ML 0.9% SOD CHLO USP POUR PLAS BTL

## (undated) DEVICE — BANDAGE COMPR W4INXL10YD WHITE/BEIGE E MTRX HK LOOP CLSR

## (undated) DEVICE — 2.0MM DRILL BIT

## (undated) DEVICE — PRECISION (9.0 X 0.51 X 25.0MM)